# Patient Record
Sex: FEMALE | Race: WHITE | URBAN - METROPOLITAN AREA
[De-identification: names, ages, dates, MRNs, and addresses within clinical notes are randomized per-mention and may not be internally consistent; named-entity substitution may affect disease eponyms.]

---

## 2017-04-03 RX ORDER — NEBIVOLOL HYDROCHLORIDE 10 MG/1
TABLET ORAL
Qty: 180 TAB | Refills: 3 | Status: SHIPPED | OUTPATIENT
Start: 2017-04-03 | End: 2018-04-17 | Stop reason: SDUPTHER

## 2017-04-07 RX ORDER — INSULIN GLARGINE 100 [IU]/ML
INJECTION, SOLUTION SUBCUTANEOUS
Qty: 20 ML | Refills: 11 | Status: SHIPPED | OUTPATIENT
Start: 2017-04-07 | End: 2018-04-09 | Stop reason: SDUPTHER

## 2017-04-18 RX ORDER — METFORMIN HYDROCHLORIDE 1000 MG/1
TABLET ORAL
Qty: 180 TAB | Refills: 3 | Status: SHIPPED | OUTPATIENT
Start: 2017-04-18 | End: 2018-05-21 | Stop reason: SDUPTHER

## 2017-08-03 RX ORDER — BLOOD-GLUCOSE METER
KIT MISCELLANEOUS
Qty: 150 STRIP | Refills: 11 | Status: SHIPPED | OUTPATIENT
Start: 2017-08-03 | End: 2018-10-29 | Stop reason: SDUPTHER

## 2017-09-19 ENCOUNTER — OFFICE VISIT (OUTPATIENT)
Dept: ENDOCRINOLOGY | Age: 67
End: 2017-09-19

## 2017-09-19 VITALS
BODY MASS INDEX: 35.37 KG/M2 | WEIGHT: 192.2 LBS | HEIGHT: 62 IN | SYSTOLIC BLOOD PRESSURE: 155 MMHG | HEART RATE: 58 BPM | DIASTOLIC BLOOD PRESSURE: 80 MMHG

## 2017-09-19 DIAGNOSIS — E11.9 CONTROLLED TYPE 2 DIABETES MELLITUS WITHOUT COMPLICATION, WITH LONG-TERM CURRENT USE OF INSULIN (HCC): ICD-10-CM

## 2017-09-19 DIAGNOSIS — Z79.4 CONTROLLED TYPE 2 DIABETES MELLITUS WITHOUT COMPLICATION, WITH LONG-TERM CURRENT USE OF INSULIN (HCC): ICD-10-CM

## 2017-09-19 DIAGNOSIS — I10 ESSENTIAL HYPERTENSION, BENIGN: Primary | ICD-10-CM

## 2017-09-19 DIAGNOSIS — E78.2 MIXED HYPERLIPIDEMIA: ICD-10-CM

## 2017-09-19 RX ORDER — HYDROCODONE BITARTRATE AND ACETAMINOPHEN 7.5; 325 MG/1; MG/1
TABLET ORAL
Refills: 0 | COMMUNITY
Start: 2017-09-11 | End: 2021-02-16

## 2017-09-19 RX ORDER — HYDROCHLOROTHIAZIDE 12.5 MG/1
25 TABLET ORAL DAILY
COMMUNITY
End: 2022-03-01

## 2017-09-19 RX ORDER — LISINOPRIL 40 MG/1
40 TABLET ORAL DAILY
COMMUNITY
End: 2022-03-01

## 2017-09-19 RX ORDER — GABAPENTIN 800 MG/1
TABLET ORAL 3 TIMES DAILY
COMMUNITY

## 2017-09-19 NOTE — PATIENT INSTRUCTIONS
1) Try not to snack on anything with sugar after dinner as this will cause your blood sugar to be higher the next morning. You can try sugar free jello or pudding or raw veggies or nuts as these won't cause your sugar to spike overnight or take a dose of protein before bed (slice of cheese or deli meat or handful of almonds or 1 teaspoon of peanut butter). If you do have a craving for some carbs at bedtime, take 6-8 units of novolog to cover this to prevent your fasting sugar from being higher. 2) If you don't eat any carbs at night and are still having fasting sugars over 130, then think about increasing the lantus to 45 units. 3) I will send you a message through [employer] with your lab results. 4) It's possible you may need to go back on a full 25 mg of hctz to help with blood pressure so discuss with your cardiologist to see if they are in agreement. 5) I will mail you a lab slip about 3-4 weeks before your next visit to have your labs repeated 3-4 days prior to your next visit.

## 2017-09-19 NOTE — PROGRESS NOTES
Chief Complaint   Patient presents with    Diabetes     pcp and pharmacy confirmed    Other     eye exam is due    Other     pcp jason Smith     History of Present Illness: Miguel A Kessler is a 79 y.o. female here for follow up of diabetes. Weight up 2 lbs since last visit in 9/17. Had right knee arthroscopy in 1/17 to try to buy time for a knee replacement and did help for the first 6 months but recently is starting to have more pain. About 2 months developed an episode of vertigo and was given meclizine and this helped. Aside from these 2 things, no other new medical problems over the past year. Currently taking lantus 40 units at bedtime and about 10-20 units of novolog before food. Fasting sugars have been higher recently in the 140-170s but other days can be in the 90-120s. Is currently in a lot of pain with her knee and back and this can be contributing to some of her higher sugars. During the day can keep her sugars between . She does admit to having some carbs at bedtime like toast or cereal and doesn't take any novolog for this. States that her lis/hctz was stopped and changed to lisinopril 40 mg daily and hctz 12.5 mg daily by her cardiologist but her BP is running higher today. Current Outpatient Prescriptions   Medication Sig    HYDROcodone-acetaminophen (NORCO) 7.5-325 mg per tablet TAKE 1 TABLET BY MOUTH TWICE DAILY AS NEEDED    gabapentin (NEURONTIN) 800 mg tablet Take  by mouth three (3) times daily.  lisinopril (PRINIVIL, ZESTRIL) 40 mg tablet Take 40 mg by mouth daily.  hydroCHLOROthiazide (HYDRODIURIL) 12.5 mg tablet Take 12.5 mg by mouth daily.  FREESTYLE LITE STRIPS strip USE TO TEST UP TO 4 TIMES DAILY    metFORMIN (GLUCOPHAGE) 1,000 mg tablet TAKE 1 TABLET BY MOUTH TWO TIMES DAILY (WITH MEALS).     LANTUS 100 unit/mL injection INJECT 36 UNITS SUBCUTANEOUSLY NIGHTLY OR AS DIRECTED UP TO 60 UNITS PER DAY    BYSTOLIC 10 mg tablet TAKE 1 TABLET BY MOUTH TWO TIMES A DAY.  ZETIA 10 mg tablet TAKE 1 TABLET BY MOUTH DAILY    tapentadol (NUCYNTA) 50 mg tablet Take 50 mg by mouth three (3) times daily.  NOVOLOG 100 unit/mL injection INJECT 21-36 UNITS BEFORE MEALS    TRUEPLUS INSULIN 1 mL 31 gauge x 5/16 syrg USE AS DIRECTED TO INJECT INSULIN DAILY    isosorbide mononitrate ER (IMDUR) 30 mg tablet Take  by mouth daily.  ULTRA COMFORT INSULIN SYRINGE 1 mL 31 x 5/16\" syrg USE AS DIRECTED TO INJECT INSULIN DAILY    propranolol (INDERAL) 10 mg tablet Take  by mouth two (2) times a day.  OTHER,NON-FORMULARY, Plasamanex      1 po bid for blood thinning    Insulin Needles, Disposable, (BD INSULIN PEN NEEDLE UF SHORT) 31 X 5/16 \" Ndle Use as directed up to 4 times daily    CALCIUM CARBONATE/VITAMIN D3 (CALCIUM + D PO) Take  by mouth daily.  duloxetine (CYMBALTA) 60 mg capsule Take 60 mg by mouth daily.  trazodone (DESYREL) 100 mg tablet Take 100 mg by mouth nightly.  UBIDECARENONE/VITAMIN E MIXED (COQ10  PO) Take  by mouth daily.  aspirin delayed-release 81 mg tablet Take  by mouth daily.  ERGOCALCIFEROL, VITAMIN D2, (VITAMIN D PO) Take 2,000 Units by mouth. 4 drops daily      No current facility-administered medications for this visit.       Allergies   Allergen Reactions    Adhesive Tape-Silicones Rash    Compazine [Prochlorperazine Edisylate] Other (comments)    Gemfibrozil Other (comments)     Nausea, liver pain, bone pain    Inova [Benzoyl Peroxide] Other (comments)    Iodine Shortness of Breath, Rash and Swelling    Oxycontin [Oxycodone] Rash    Statins-Hmg-Coa Reductase Inhibitors Myalgia     With lipitor, crestor, and pravastatin    Wellbutrin [Bupropion Hcl] Other (comments)     Had homicidal thoughts     Review of Systems:  - Eyes: no blurry vision or double vision  - Cardiovascular: no chest pain  - Respiratory: no shortness of breath  - Musculoskeletal: no myalgias  - Neurological: occ numbness/tingling in extremities    Physical Examination:  Blood pressure 155/80, pulse (!) 58, height 5' 2\" (1.575 m), weight 192 lb 3.2 oz (87.2 kg). - General: pleasant, no distress, good eye contact   - Neck: no carotid bruits  - Cardiovascular: regular, normal rate, nl s1 and s2, no m/r/g,   - Respiratory: clear bilaterally  - Integumentary: no edema,   - Psychiatric: normal mood and affect         Diabetic foot exam performed by Lennie Jarvis MD     Measurement  Response Nurse Comment Physician Comment   Monofilament  R - reduced sensation with micro filament  L - reduced sensation with micro filament     Pulse DP R - 2+ (normal)  L - 2+ (normal)     Vibration R - normal  L - normal     Structural deformity R - None  L - None     Skin Integrity / Deformity R - Mild - callus  L - Mild - callus        Reviewed by:                 Data Reviewed:   - none new for review    Assessment/Plan:   1. DM w/o complication type II, controlled (250.00) her most recent Hgb A1c was 5.8% in 9/16 down from 6.7% in 3/16 up from 6.3% in 9/15 down from 6.7% in 3/15 down from 6.8% in 9/14 up from 5.9% in 4/14 down 6.3% in 10/13 stable from June down from 6.4% in Feb 2013 up from 6.3% in October up from 6.1% in June down from 6.5% in March down from 6.7% in December 2011 down from 7% in September down from 7.2% in June down from 9.5% in March. Her blood sugars are doing well aside from higher fasting sugars due to snacking on carbs at night so will work on this. I would prefer her A1c to be 6-7% rather than under 6% to help with lows. Will continue to see annually. - cont Lantus 40 units at bedtime  - cont Novolog 10-20 units with meals plus 3 units for every 50 mg/dl above 150 mg/dl  - cont Metformin 1000 mg bid  - check bs 3-4x day due to fluctuating blood sugars  - foot exam done 9/17  - optho UTD spring 2014--due now  - microalbumin nl 9/16  - check Hgb A1c, cmp, and microalbumin today and prior to next visit       2.  Unspecified essential hypertension (401.9) her BP was above goal < 140/90 but normally is at goal but had her hctz decreased by cardiology and may need to have this increased. -  cont current regimen      3. Mixed hyperlipidemia: Given DM and CAD, Goal LDL < 70, non-HDL < 100, and TG < 150. Non- and LDL 78 on zetia in Oct 2012, up to 137 and 92 in 6/13 due to weight gain and 145 and 90 in 10/13. Down to 140 and 88 in 4/14 and 131 and 74 in 9/14 and 135 and 100 in 3/15 and 85 in 9/15 and 64 in 3/16 and 84 in 9/16. May consider PCSK9 inhibitor in the future. - cont zetia 10 mg daily  - check lipids today and prior to next visit       Patient Instructions   1) Try not to snack on anything with sugar after dinner as this will cause your blood sugar to be higher the next morning. You can try sugar free jello or pudding or raw veggies or nuts as these won't cause your sugar to spike overnight or take a dose of protein before bed (slice of cheese or deli meat or handful of almonds or 1 teaspoon of peanut butter). If you do have a craving for some carbs at bedtime, take 6-8 units of novolog to cover this to prevent your fasting sugar from being higher. 2) If you don't eat any carbs at night and are still having fasting sugars over 130, then think about increasing the lantus to 45 units. 3) I will send you a message through [employer] with your lab results. 4) It's possible you may need to go back on a full 25 mg of hctz to help with blood pressure so discuss with your cardiologist to see if they are in agreement. 5) I will mail you a lab slip about 3-4 weeks before your next visit to have your labs repeated 3-4 days prior to your next visit. Follow-up Disposition:  Return in about 1 year (around 9/19/2018).     Copy sent to:  Dr. Sundar Cotter Fax: [phone], Phone [phone]  Dr. Cayla Duffy (cardiology at [facility] in [place])    Lab follow up: 9/23/17  Component      Latest Ref Rng & Units 9/19/2017 9/19/2017 9/19/2017 9/19/2017           5:01 PM  5:01 PM  5:01 PM  5:01 PM   Glucose      65 - 99 mg/dL  117 (H)     BUN      8 - 27 mg/dL  18     Creatinine      0.57 - 1.00 mg/dL  0.97     GFR est non-AA      >59 mL/min/1.73  61     GFR est AA      >59 mL/min/1.73  70     BUN/Creatinine ratio      12 - 28  19     Sodium      134 - 144 mmol/L  137     Potassium      3.5 - 5.2 mmol/L  4.5     Chloride      96 - 106 mmol/L  99     CO2      18 - 29 mmol/L  24     Calcium      8.7 - 10.3 mg/dL  9.3     Protein, total      6.0 - 8.5 g/dL  6.6     Albumin      3.6 - 4.8 g/dL  4.4     GLOBULIN, TOTAL      1.5 - 4.5 g/dL  2.2     A-G Ratio      1.2 - 2.2  2.0     Bilirubin, total      0.0 - 1.2 mg/dL  0.3     Alk. phosphatase      39 - 117 IU/L  75     AST      0 - 40 IU/L  12     ALT (SGPT)      0 - 32 IU/L  18     Cholesterol, total      100 - 199 mg/dL 212 (H)      Triglyceride      0 - 149 mg/dL 240 (H)      HDL Cholesterol      >39 mg/dL 49      VLDL, calculated      5 - 40 mg/dL 48 (H)      LDL, calculated      0 - 99 mg/dL 115 (H)      Creatinine, urine      Not Estab. mg/dL   132.0    Microalbumin, urine      Not Estab. ug/mL   6.1    Microalbumin/Creat. Ratio      0.0 - 30.0 mg/g creat   4.6    Hemoglobin A1c, (calculated)      4.8 - 5.6 %    6.9 (H)   Estimated average glucose      mg/dL    151     Sent her the following message through [employer]:    Hemoglobin A1c is a 3 month marker of your diabetes control. Goal is less than 7% which means your average blood sugar is less than 150. Your Hemoglobin A1c is 6.9% which means your diabetes is under slightly worse control than 5.8% at your last check. Continue to work on your diet and exercise and take all your medications as directed.  -------------------------------------------------------------------------------------------------------------------  Total Cholesterol is the total number of cholesterol particles in your blood. Goal is less than 200. Your value is above goal.    Triglycerides are the short term fats in your blood. Goal is less than 150. Your value is above goal.    HDL is the good cholesterol in your blood. Goal is more than 50. Your value is below goal.    LDL is the bad cholesterol in your blood. Goal is less than 70. Your value is above goal.    Continue to follow a low cholesterol diet. Try to limit the amount of fried foods, fatty foods, butter, gravy, red meat, ice cream, cheese, and eggs in your diet, which are all high in cholesterol. Take all of your medications (zetia) as directed.  -------------------------------------------------------------------------------------------------------------------  BUN and creatinine are markers of kidney function. Your values are normal.  -------------------------------------------------------------------------------------------------------------------  ALT and AST are markers of liver function. Your values are normal.  -------------------------------------------------------------------------------------------------------------------  Microalbumin/creatinine ratio is a marker of the amount of protein in your urine. Goal is less than 30. Your value is normal. This indicates that your kidneys are not being affected by your diabetes and/or blood pressure. Continue to take lisinopril to help protect your kidneys from the effects of diabetes and high blood pressure.

## 2017-09-19 NOTE — PROGRESS NOTES
HISTORY OF PRESENT ILLNESS  Rosa Rowe is a 79 y.o. female.   HPI    ROS    Physical Exam    ASSESSMENT and PLAN  {ASSESSMENT/PLAN:12087}

## 2017-09-19 NOTE — MR AVS SNAPSHOT
Visit Information Date & Time Provider Department Dept. Phone Encounter #  
 9/19/2017  1:30 PM Gisele Ramirez, [address] [facility] [phone] [id number] Follow-up Instructions Return in about 1 year (around 9/19/2018). Upcoming Health Maintenance Date Due Hepatitis C Screening 1950 DTaP/Tdap/Td series (1 - Tdap) 6/29/1971 BREAST CANCER SCRN MAMMOGRAM 6/29/2000 FOBT Q 1 YEAR AGE 50-75 6/29/2000 ZOSTER VACCINE AGE 60> 4/29/2010 EYE EXAM RETINAL OR DILATED Q1 3/1/2015 GLAUCOMA SCREENING Q2Y 6/29/2015 OSTEOPOROSIS SCREENING (DEXA) 6/29/2015 Pneumococcal 65+ Low/Medium Risk (1 of 2 - PCV13) 6/29/2015 MEDICARE YEARLY EXAM 6/29/2015 HEMOGLOBIN A1C Q6M 3/13/2017 MICROALBUMIN Q1 3/22/2017 INFLUENZA AGE 9 TO ADULT 8/1/2017 LIPID PANEL Q1 9/13/2017 FOOT EXAM Q1 9/23/2017 Allergies as of 9/19/2017  Review Complete On: 9/19/2017 By: Gisele Ramirez MD  
  
 Severity Noted Reaction Type Reactions Adhesive Tape-silicones  53/83/8695   Intolerance Rash Compazine [Prochlorperazine Edisylate]  03/07/2011   Intolerance Other (comments) Gemfibrozil  07/21/2011    Other (comments) Nausea, liver pain, bone pain Gustavus Crank [Benzoyl Peroxide]  03/07/2011   Intolerance Other (comments) Iodine  03/07/2011   Intolerance Shortness of Breath, Rash, Swelling Oxycontin [Oxycodone]  03/07/2011    Rash Statins-hmg-coa Reductase Inhibitors  03/07/2011    Myalgia With lipitor, crestor, and pravastatin Wellbutrin [Bupropion Hcl]  03/07/2011    Other (comments) Had homicidal thoughts Current Immunizations  Never Reviewed No immunizations on file. Not reviewed this visit You Were Diagnosed With   
  
 Codes Comments Essential hypertension, benign    -  Primary ICD-10-CM: I10 
ICD-9-CM: 401.1 Mixed hyperlipidemia     ICD-10-CM: E78.2 ICD-9-CM: 272.2 Controlled type 2 diabetes mellitus without complication, with long-term current use of insulin (HCC)     ICD-10-CM: E11.9, Z79.4 ICD-9-CM: 250.00, V58.67 Vitals BP Pulse Height(growth percentile) Weight(growth percentile) BMI Smoking Status 155/80 (!) 58 5' 2\" (1.575 m) 192 lb 3.2 oz (87.2 kg) 35.15 kg/m2 Former Smoker Vitals History BMI and BSA Data Body Mass Index Body Surface Area  
 35.15 kg/m 2 1.95 m 2 Preferred Pharmacy Pharmacy Name Phone [phone] Your Updated Medication List  
  
   
This list is accurate as of: 9/19/17  2:08 PM.  Always use your most recent med list.  
  
  
  
  
 aspirin delayed-release 81 mg tablet Take  by mouth daily. BYSTOLIC 10 mg tablet Generic drug:  nebivolol TAKE 1 TABLET BY MOUTH TWO TIMES A DAY. CALCIUM + D PO Take  by mouth daily. COQ10  PO Take  by mouth daily. CYMBALTA 60 mg capsule Generic drug:  DULoxetine Take 60 mg by mouth daily. FREESTYLE LITE STRIPS strip Generic drug:  glucose blood VI test strips USE TO TEST UP TO 4 TIMES DAILY  
  
 gabapentin 800 mg tablet Commonly known as:  NEURONTIN Take  by mouth three (3) times daily. hydroCHLOROthiazide 12.5 mg tablet Commonly known as:  HYDRODIURIL Take 12.5 mg by mouth daily. HYDROcodone-acetaminophen 7.5-325 mg per tablet Commonly known as:  NORCO  
TAKE 1 TABLET BY MOUTH TWICE DAILY AS NEEDED IMDUR 30 mg tablet Generic drug:  isosorbide mononitrate ER Take  by mouth daily. Insulin Needles (Disposable) 31 gauge x 5/16\" Ndle Commonly known as:  BD INSULIN PEN NEEDLE UF SHORT Use as directed up to 4 times daily LANTUS 100 unit/mL injection Generic drug:  insulin glargine INJECT 36 UNITS SUBCUTANEOUSLY NIGHTLY OR AS DIRECTED UP TO 60 UNITS PER DAY  
  
 lisinopril 40 mg tablet Commonly known as:  [place] Benitez Take 40 mg by mouth daily. metFORMIN 1,000 mg tablet Commonly known as:  GLUCOPHAGE  
TAKE 1 TABLET BY MOUTH TWO TIMES DAILY (WITH MEALS). NovoLOG 100 unit/mL injection Generic drug:  insulin aspart INJECT 21-36 UNITS BEFORE MEALS  
  
 NUCYNTA 50 mg tablet Generic drug:  tapentadol Take 50 mg by mouth three (3) times daily. OTHER(NON-FORMULARY) Plasamanex      1 po bid for blood thinning  
  
 propranolol 10 mg tablet Commonly known as:  INDERAL Take  by mouth two (2) times a day. traZODone 100 mg tablet Commonly known as:  [place] Take 100 mg by mouth nightly. * ULTRA COMFORT INSULIN SYRINGE 1 mL 31 gauge x 5/16 Syrg Generic drug:  Insulin Syringe-Needle U-100  
USE AS DIRECTED TO INJECT INSULIN DAILY * TRUEPLUS INSULIN 1 mL 31 gauge x 5/16 Syrg Generic drug:  Insulin Syringe-Needle U-100  
USE AS DIRECTED TO INJECT INSULIN DAILY  
  
 VITAMIN D2 PO Take 2,000 Units by mouth. 4 drops daily ZETIA 10 mg tablet Generic drug:  ezetimibe TAKE 1 TABLET BY MOUTH DAILY * Notice: This list has 2 medication(s) that are the same as other medications prescribed for you. Read the directions carefully, and ask your doctor or other care provider to review them with you. We Performed the Following HEMOGLOBIN A1C WITH EAG [79174 CPT(R)] LIPID PANEL [03037 CPT(R)] METABOLIC PANEL, COMPREHENSIVE [14886 CPT(R)] MICROALBUMIN, UR, RAND W/ MICROALBUMIN/CREA RATIO [id number] CPT(R)] GA COLLECTION VENOUS BLOOD,VENIPUNCTURE [id number] CPT(R)] GA HANDLG&/OR CONVEY OF SPEC FOR TR OFFICE TO LAB [65388 CPT(R)] Follow-up Instructions Return in about 1 year (around 9/19/2018). Patient Instructions 1) Try not to snack on anything with sugar after dinner as this will cause your blood sugar to be higher the next morning.   You can try sugar free jello or pudding or raw veggies or nuts as these won't cause your sugar to spike overnight or take a dose of protein before bed (slice of cheese or deli meat or handful of almonds or 1 teaspoon of peanut butter). If you do have a craving for some carbs at bedtime, take 6-8 units of novolog to cover this to prevent your fasting sugar from being higher. 2) If you don't eat any carbs at night and are still having fasting sugars over 130, then think about increasing the lantus to 45 units. 3) I will send you a message through [employer] with your lab results. 4) It's possible you may need to go back on a full 25 mg of hctz to help with blood pressure so discuss with your cardiologist to see if they are in agreement. 5) I will mail you a lab slip about 3-4 weeks before your next visit to have your labs repeated 3-4 days prior to your next visit. Introducing [facility]! Dear Mona Martins: Thank you for requesting a [employer] account. Our records indicate that you already have an active [employer] account. You can access your account anytime at [email or web address] Did you know that you can access your hospital and ER discharge instructions at any time in [employer]? You can also review all of your test results from your hospital stay or ER visit. Additional Information If you have questions, please visit the Frequently Asked Questions section of the [employer] website at [email or web address] Remember, [employer] is NOT to be used for urgent needs. For medical emergencies, dial 911. Now available from your iPhone and Android! Please provide this summary of care documentation to your next provider. If you have any questions after today's visit, please call [phone].

## 2017-09-21 LAB
ALBUMIN SERPL-MCNC: 4.4 G/DL (ref 3.6–4.8)
ALBUMIN/CREAT UR: 4.6 MG/G CREAT (ref 0–30)
ALBUMIN/GLOB SERPL: 2 {RATIO} (ref 1.2–2.2)
ALP SERPL-CCNC: 75 IU/L (ref 39–117)
ALT SERPL-CCNC: 18 IU/L (ref 0–32)
AST SERPL-CCNC: 12 IU/L (ref 0–40)
BILIRUB SERPL-MCNC: 0.3 MG/DL (ref 0–1.2)
BUN SERPL-MCNC: 18 MG/DL (ref 8–27)
BUN/CREAT SERPL: 19 (ref 12–28)
CALCIUM SERPL-MCNC: 9.3 MG/DL (ref 8.7–10.3)
CHLORIDE SERPL-SCNC: 99 MMOL/L (ref 96–106)
CHOLEST SERPL-MCNC: 212 MG/DL (ref 100–199)
CO2 SERPL-SCNC: 24 MMOL/L (ref 18–29)
CREAT SERPL-MCNC: 0.97 MG/DL (ref 0.57–1)
CREAT UR-MCNC: 132 MG/DL
EST. AVERAGE GLUCOSE BLD GHB EST-MCNC: 151 MG/DL
GLOBULIN SER CALC-MCNC: 2.2 G/DL (ref 1.5–4.5)
GLUCOSE SERPL-MCNC: 117 MG/DL (ref 65–99)
HBA1C MFR BLD: 6.9 % (ref 4.8–5.6)
HDLC SERPL-MCNC: 49 MG/DL
INTERPRETATION, 910389: NORMAL
LDLC SERPL CALC-MCNC: 115 MG/DL (ref 0–99)
Lab: NORMAL
MICROALBUMIN UR-MCNC: 6.1 UG/ML
POTASSIUM SERPL-SCNC: 4.5 MMOL/L (ref 3.5–5.2)
PROT SERPL-MCNC: 6.6 G/DL (ref 6–8.5)
SODIUM SERPL-SCNC: 137 MMOL/L (ref 134–144)
TRIGL SERPL-MCNC: 240 MG/DL (ref 0–149)
VLDLC SERPL CALC-MCNC: 48 MG/DL (ref 5–40)

## 2017-10-05 RX ORDER — INSULIN ASPART 100 [IU]/ML
INJECTION, SOLUTION INTRAVENOUS; SUBCUTANEOUS
Qty: 30 ML | Refills: 11 | Status: SHIPPED | OUTPATIENT
Start: 2017-10-05 | End: 2018-12-15 | Stop reason: SDUPTHER

## 2017-11-29 RX ORDER — EZETIMIBE 10 MG
TABLET ORAL
Qty: 30 TAB | Refills: 11 | Status: SHIPPED | OUTPATIENT
Start: 2017-11-29 | End: 2018-11-15 | Stop reason: SDUPTHER

## 2017-12-05 RX ORDER — SYRING-NEEDL,DISP,INSUL,0.3 ML 30 GX5/16"
SYRINGE, EMPTY DISPOSABLE MISCELLANEOUS
Qty: 100 SYRINGE | Refills: 11 | Status: SHIPPED | OUTPATIENT
Start: 2017-12-05 | End: 2019-01-22 | Stop reason: SDUPTHER

## 2018-04-10 RX ORDER — INSULIN GLARGINE 100 [IU]/ML
INJECTION, SOLUTION SUBCUTANEOUS
Qty: 20 ML | Refills: 11 | Status: SHIPPED | OUTPATIENT
Start: 2018-04-10 | End: 2019-04-15 | Stop reason: SDUPTHER

## 2018-04-17 RX ORDER — NEBIVOLOL HYDROCHLORIDE 10 MG/1
TABLET ORAL
Qty: 180 TAB | Refills: 3 | Status: SHIPPED | OUTPATIENT
Start: 2018-04-17 | End: 2019-05-11 | Stop reason: SDUPTHER

## 2018-05-21 RX ORDER — METFORMIN HYDROCHLORIDE 1000 MG/1
TABLET ORAL
Qty: 180 TAB | Refills: 3 | Status: SHIPPED | OUTPATIENT
Start: 2018-05-21 | End: 2019-06-11 | Stop reason: SDUPTHER

## 2018-08-22 DIAGNOSIS — Z79.4 CONTROLLED TYPE 2 DIABETES MELLITUS WITHOUT COMPLICATION, WITH LONG-TERM CURRENT USE OF INSULIN (HCC): Primary | ICD-10-CM

## 2018-08-22 DIAGNOSIS — I10 ESSENTIAL HYPERTENSION, BENIGN: ICD-10-CM

## 2018-08-22 DIAGNOSIS — E78.2 MIXED HYPERLIPIDEMIA: ICD-10-CM

## 2018-08-22 DIAGNOSIS — E11.9 CONTROLLED TYPE 2 DIABETES MELLITUS WITHOUT COMPLICATION, WITH LONG-TERM CURRENT USE OF INSULIN (HCC): Primary | ICD-10-CM

## 2018-10-29 DIAGNOSIS — Z79.4 CONTROLLED TYPE 2 DIABETES MELLITUS WITHOUT COMPLICATION, WITH LONG-TERM CURRENT USE OF INSULIN (HCC): Primary | ICD-10-CM

## 2018-10-29 DIAGNOSIS — E11.9 CONTROLLED TYPE 2 DIABETES MELLITUS WITHOUT COMPLICATION, WITH LONG-TERM CURRENT USE OF INSULIN (HCC): Primary | ICD-10-CM

## 2018-10-29 RX ORDER — BLOOD-GLUCOSE METER
KIT MISCELLANEOUS
Qty: 150 STRIP | Refills: 11 | Status: SHIPPED | OUTPATIENT
Start: 2018-10-29 | End: 2020-02-20

## 2018-11-15 RX ORDER — EZETIMIBE 10 MG
TABLET ORAL
Qty: 30 TAB | Refills: 11 | Status: SHIPPED | OUTPATIENT
Start: 2018-11-15 | End: 2019-11-18 | Stop reason: SDUPTHER

## 2018-12-15 RX ORDER — INSULIN ASPART 100 [IU]/ML
INJECTION, SOLUTION INTRAVENOUS; SUBCUTANEOUS
Qty: 30 ML | Refills: 11 | Status: SHIPPED | OUTPATIENT
Start: 2018-12-15 | End: 2020-01-15

## 2019-01-22 RX ORDER — CALCIUM CARB/VITAMIN D3/VIT K1 500-100-40
TABLET,CHEWABLE ORAL
Qty: 100 SYRINGE | Refills: 11 | Status: SHIPPED | OUTPATIENT
Start: 2019-01-22 | End: 2020-02-17

## 2019-04-15 RX ORDER — INSULIN GLARGINE 100 [IU]/ML
INJECTION, SOLUTION SUBCUTANEOUS
Qty: 20 ML | Refills: 11 | Status: SHIPPED | OUTPATIENT
Start: 2019-04-15 | End: 2019-05-20

## 2019-05-11 RX ORDER — NEBIVOLOL HYDROCHLORIDE 10 MG/1
TABLET ORAL
Qty: 180 TAB | Refills: 3 | Status: SHIPPED | OUTPATIENT
Start: 2019-05-11 | End: 2020-05-19

## 2019-05-14 LAB
CREATININE, EXTERNAL: 1.05
HBA1C MFR BLD HPLC: 7.4 %
LDL-C, EXTERNAL: 128
MICROALBUMIN UR TEST STR-MCNC: 0.6 MG/DL

## 2019-05-20 ENCOUNTER — OFFICE VISIT (OUTPATIENT)
Dept: ENDOCRINOLOGY | Age: 69
End: 2019-05-20

## 2019-05-20 VITALS
SYSTOLIC BLOOD PRESSURE: 145 MMHG | DIASTOLIC BLOOD PRESSURE: 76 MMHG | HEIGHT: 62 IN | WEIGHT: 190.2 LBS | BODY MASS INDEX: 35 KG/M2 | HEART RATE: 69 BPM

## 2019-05-20 DIAGNOSIS — I10 ESSENTIAL HYPERTENSION, BENIGN: ICD-10-CM

## 2019-05-20 DIAGNOSIS — Z79.4 CONTROLLED TYPE 2 DIABETES MELLITUS WITHOUT COMPLICATION, WITH LONG-TERM CURRENT USE OF INSULIN (HCC): Primary | ICD-10-CM

## 2019-05-20 DIAGNOSIS — E78.2 MIXED HYPERLIPIDEMIA: ICD-10-CM

## 2019-05-20 DIAGNOSIS — E11.9 CONTROLLED TYPE 2 DIABETES MELLITUS WITHOUT COMPLICATION, WITH LONG-TERM CURRENT USE OF INSULIN (HCC): Primary | ICD-10-CM

## 2019-05-20 RX ORDER — INSULIN GLARGINE 100 [IU]/ML
INJECTION, SOLUTION SUBCUTANEOUS
Qty: 20 ML | Refills: 11
Start: 2019-05-20 | End: 2020-02-18

## 2019-05-20 NOTE — PROGRESS NOTES
Chief Complaint Patient presents with  Diabetes  
  pcp amd pharmacy confirmed  Other  
  eye exam is due History of Present Illness: Yaquelin Kenny is a 76 y.o. female here for follow up of diabetes. Weight down 2 lbs since last visit in 9/17. Underwent right knee replacement in 1/18 and had back surgery in 4/19. She is  from her  the past 3 years but has remained . Her A1c was 7.1% about 6 months ago and the pain has been driving up her blood sugar and currently is taking 50 units of lantus per day for the past 6 months. Has been taking 10-20 units of novolog with meals. Over the past month her pain has been much improved and her fasting sugars have come way down to the 110s or lower. Has been trying to eat more protein to help with healing. In the afternoon is usually under 175. Has been eating more prepared meals recently that could have some higher fat content and still taking zetia and hasn't been out of this. Has been walking to her mailbox and back which is about a city block for the past few weeks. Her hctz has been increased back to 25 mg daily. Her home BP readings are under 140/90. Current Outpatient Medications Medication Sig  
 insulin glargine (LANTUS U-100 INSULIN) 100 unit/mL injection INJECT 50 UNITS SUBCUTANEOUSLY NIGHTLY OR AS DIRECTED UP TO 60 UNITS PER DAY--Dose change 5/20/19--updated med list--did not send prescription to the pharmacy  BYSTOLIC 10 mg tablet TAKE 1 TABLET BY MOUTH TWO TIMES A DAY  Insulin Syringe-Needle U-100 1 mL 31 gauge x 5/16 syrg USE AS DIRECTED TO INJECT INSULIN DAILY  NOVOLOG U-100 INSULIN ASPART 100 unit/mL injection INJECT 21-36 UNITS SUBCUTANEOUSLY BEFORE MEALS  ZETIA 10 mg tablet TAKE 1 TABLET BY MOUTH DAILY  FREESTYLE LITE STRIPS strip USE TO TEST BLOOD SUGAR UP TO 4 TIMES DAILY  metFORMIN (GLUCOPHAGE) 1,000 mg tablet TAKE ONE TABLET BY MOUTH TWICE DAILY WITH MEALS  
  HYDROcodone-acetaminophen (NORCO) 7.5-325 mg per tablet TAKE 1 TABLET BY MOUTH TWICE DAILY AS NEEDED  
 gabapentin (NEURONTIN) 800 mg tablet Take  by mouth three (3) times daily.  lisinopril (PRINIVIL, ZESTRIL) 40 mg tablet Take 40 mg by mouth daily.  hydroCHLOROthiazide (HYDRODIURIL) 12.5 mg tablet Take 25 mg by mouth daily.  isosorbide mononitrate ER (IMDUR) 30 mg tablet Take  by mouth daily.  ULTRA COMFORT INSULIN SYRINGE 1 mL 31 x 5/16\" syrg USE AS DIRECTED TO INJECT INSULIN DAILY  propranolol (INDERAL) 10 mg tablet Take  by mouth two (2) times a day.  OTHER,NON-FORMULARY, Plasamanex      1 po bid for blood thinning  Insulin Needles, Disposable, (BD INSULIN PEN NEEDLE UF SHORT) 31 X 5/16 \" Ndle Use as directed up to 4 times daily  CALCIUM CARBONATE/VITAMIN D3 (CALCIUM + D PO) Take  by mouth daily.  duloxetine (CYMBALTA) 60 mg capsule Take 60 mg by mouth daily.  trazodone (DESYREL) 100 mg tablet Take 100 mg by mouth nightly.  UBIDECARENONE/VITAMIN E MIXED (COQ10  PO) Take  by mouth daily.  aspirin delayed-release 81 mg tablet Take  by mouth daily.  ERGOCALCIFEROL, VITAMIN D2, (VITAMIN D PO) Take 4,000 Units by mouth. No current facility-administered medications for this visit. Allergies Allergen Reactions  Adhesive Tape-Silicones Rash  Compazine [Prochlorperazine Edisylate] Other (comments)  Gemfibrozil Other (comments) Nausea, liver pain, bone pain  [place] Petrin Peroxide] Other (comments)  Iodine Shortness of Breath, Rash and Swelling  Oxycontin [Oxycodone] Rash  Statins-Hmg-Coa Reductase Inhibitors Myalgia With lipitor, crestor, and pravastatin  Wellbutrin [Bupropion Hcl] Other (comments) Had homicidal thoughts Review of Systems: - Eyes: no blurry vision or double vision - Cardiovascular: no chest pain - Respiratory: no shortness of breath - Musculoskeletal: no myalgias - Neurological: no numbness/tingling in extremities Physical Examination: 
Blood pressure 145/76, pulse 69, height 5' 2\" (1.575 m), weight 190 lb 3.2 oz (86.3 kg). - General: pleasant, no distress, good eye contact  
- Neck: no carotid bruits - Cardiovascular: regular, normal rate, nl s1 and s2, no m/r/g,  
- Respiratory: clear bilaterally - Integumentary: no edema,  
- Psychiatric: normal mood and affect Diabetic foot exam:  
 
Left Foot: 
 Visual Exam: callous - mild Pulse DP: 2+ (normal) Filament test: normal sensation Vibratory sensation: normal 
   
Right Foot: 
 Visual Exam: callous - mild Pulse DP: 2+ (normal) Filament test: normal sensation Vibratory sensation: normal 
 
 
Data Reviewed:  
- Hgb A1c 7.4% 
- lipids: total 221 ,  HDL 50, ,  - ALT 15, AST 13 
- BUN/Cr 25/1.05 
- microalbumin 4 Assessment/Plan: 1. DM w/o complication type II, controlled (250.00) her most recent Hgb A1c was 7.4% in 5/19 up from 6.9% in 9/17 up from 5.8% in 9/16 down from 6.7% in 3/16 up from 6.3% in 9/15 down from 6.7% in 3/15 down from 6.8% in 9/14 up from 5.9% in 4/14 down 6.3% in 10/13 stable from June down from 6.4% in Feb 2013 up from 6.3% in October up from 6.1% in June down from 6.5% in March down from 6.7% in December 2011 down from 7% in September down from 7.2% in June down from 9.5% in March. Her sugars have been higher due to more pain but are coming down now that she has had surgery. - cont Lantus 50 units at bedtime and taper as below 
- cont Novolog 10-20 units with meals plus 3 units for every 50 mg/dl above 150 mg/dl 
- cont Metformin 1000 mg bid 
- check bs 3 times per day 
- foot exam done 5/19 
- optho UTD spring 2014--due now 
- microalbumin nl 5/18 
- check Hgb A1c and cmp prior to next visit (send to [address]) 2. Unspecified essential hypertension (401.9) her BP was above goal < 140/90 but normally is at goal so won't make any changes -  cont current regimen 3. Mixed hyperlipidemia: Given DM and CAD, Goal LDL < 70, non-HDL < 100, and TG < 150. Non- and LDL 78 on zetia in Oct 2012, up to 137 and 92 in 6/13 due to weight gain and 145 and 90 in 10/13. Down to 140 and 88 in 4/14 and 131 and 74 in 9/14 and 135 and 100 in 3/15 and 85 in 9/15 and 64 in 3/16 and 84 in 9/16. Up to 115 in 9/17 and 128 in 5/19. May consider PCSK9 inhibitor in the future. - cont zetia 10 mg daily - check lipids prior to next visit Patient Instructions 1) Your sugars sound like they are coming down now that you have had surgery. If your fasting sugars are staying under 110 over the next week, then try cutting back by 4 units of lantus to keep your morning sugars between 110-125. 
 
2) Your blood pressure was high today but it sounds like home readings are better so I won't make any changes. 3) Your LDL (bad cholesterol) was 128 and we want to get this back under 100 so hopefully with better A1c and less fatty meals, this will improve. 4) Your urine protein was normal.   
 
 
Follow-up and Dispositions · Return in about 6 months (around 11/20/2019). Copy sent to: 
Dr. Scott Cruz Fax: [phone], Phone [phone] Dr. Jaylene Greene (cardiology at [facility] in [place])

## 2019-06-11 RX ORDER — METFORMIN HYDROCHLORIDE 1000 MG/1
TABLET ORAL
Qty: 180 TAB | Refills: 3 | Status: SHIPPED | OUTPATIENT
Start: 2019-06-11 | End: 2020-06-23

## 2019-11-18 RX ORDER — EZETIMIBE 10 MG
TABLET ORAL
Qty: 30 TAB | Refills: 11 | Status: SHIPPED | OUTPATIENT
Start: 2019-11-18 | End: 2020-12-08

## 2020-01-15 RX ORDER — INSULIN ASPART 100 [IU]/ML
INJECTION, SOLUTION INTRAVENOUS; SUBCUTANEOUS
Qty: 30 ML | Refills: 11 | Status: SHIPPED | OUTPATIENT
Start: 2020-01-15 | End: 2020-02-18

## 2020-02-15 LAB
ALB/GLOBRATIO, 58C: 2 (CALC) (ref 1–2.5)
ALBUMIN SERPL-MCNC: 4.2 G/DL (ref 3.6–5.1)
ALP SERPL-CCNC: 111 U/L (ref 37–153)
ALT SERPL-CCNC: 31 U/L (ref 6–29)
AST SERPL W P-5'-P-CCNC: 45 U/L (ref 10–35)
BILIRUB SERPL-MCNC: 0.4 MG/DL (ref 0.2–1.2)
BUN SERPL-MCNC: 26 MG/DL (ref 7–25)
BUN/CREATININE RATIO,BUCR: 23 (CALC) (ref 6–22)
CALCIUM SERPL-MCNC: 9.6 MG/DL (ref 8.6–10.4)
CHLORIDE SERPL-SCNC: 102 MMOL/L (ref 98–110)
CHOL/HDL RATIO,CHHDX: 4.1 (CALC)
CHOLEST SERPL-MCNC: 171 MG/DL
CO2 SERPL-SCNC: 31 MMOL/L (ref 20–32)
CREAT SERPL-MCNC: 1.12 MG/DL (ref 0.5–0.99)
EAG (MG/DL),9916804: 160 (CALC)
EAG (MMOL/L),9916805: 8.9 (CALC)
GLOBULIN,GLOB: 2.1 G/DL (CALC) (ref 1.9–3.7)
GLUCOSE SERPL-MCNC: 107 MG/DL (ref 65–99)
HBA1C MFR BLD HPLC: 7.2 % OF TOTAL HGB
HDLC SERPL-MCNC: 42 MG/DL
LDL-CHOLESTEROL: 98 MG/DL (CALC)
NON-HDL CHOLESTEROL, 011976: 129 MG/DL (CALC)
POTASSIUM SERPL-SCNC: 4.7 MMOL/L (ref 3.5–5.3)
PROT SERPL-MCNC: 6.3 G/DL (ref 6.1–8.1)
SODIUM SERPL-SCNC: 138 MMOL/L (ref 135–146)
TRIGL SERPL-MCNC: 213 MG/DL (ref ?–150)

## 2020-02-17 RX ORDER — CALCIUM CARB/VITAMIN D3/VIT K1 500-100-40
TABLET,CHEWABLE ORAL
Qty: 400 SYRINGE | Refills: 3 | Status: SHIPPED | OUTPATIENT
Start: 2020-02-17 | End: 2021-04-27

## 2020-02-18 ENCOUNTER — OFFICE VISIT (OUTPATIENT)
Dept: ENDOCRINOLOGY | Age: 70
End: 2020-02-18

## 2020-02-18 VITALS
WEIGHT: 183.3 LBS | DIASTOLIC BLOOD PRESSURE: 56 MMHG | HEART RATE: 74 BPM | HEIGHT: 62 IN | BODY MASS INDEX: 33.73 KG/M2 | SYSTOLIC BLOOD PRESSURE: 128 MMHG

## 2020-02-18 DIAGNOSIS — E11.21 TYPE 2 DIABETES WITH NEPHROPATHY (HCC): ICD-10-CM

## 2020-02-18 DIAGNOSIS — Z79.4 TYPE 2 DIABETES MELLITUS WITH DIABETIC NEUROPATHY, WITH LONG-TERM CURRENT USE OF INSULIN (HCC): ICD-10-CM

## 2020-02-18 DIAGNOSIS — E78.2 MIXED HYPERLIPIDEMIA: ICD-10-CM

## 2020-02-18 DIAGNOSIS — E11.40 TYPE 2 DIABETES MELLITUS WITH DIABETIC NEUROPATHY, WITH LONG-TERM CURRENT USE OF INSULIN (HCC): ICD-10-CM

## 2020-02-18 DIAGNOSIS — I10 ESSENTIAL HYPERTENSION, BENIGN: ICD-10-CM

## 2020-02-18 DIAGNOSIS — Z79.4 CONTROLLED TYPE 2 DIABETES MELLITUS WITHOUT COMPLICATION, WITH LONG-TERM CURRENT USE OF INSULIN (HCC): Primary | ICD-10-CM

## 2020-02-18 DIAGNOSIS — E11.9 CONTROLLED TYPE 2 DIABETES MELLITUS WITHOUT COMPLICATION, WITH LONG-TERM CURRENT USE OF INSULIN (HCC): Primary | ICD-10-CM

## 2020-02-18 RX ORDER — INSULIN ASPART 100 [IU]/ML
INJECTION, SOLUTION INTRAVENOUS; SUBCUTANEOUS
Qty: 30 ML | Refills: 11 | Status: SHIPPED | OUTPATIENT
Start: 2020-02-18 | End: 2021-03-22

## 2020-02-18 RX ORDER — INSULIN GLARGINE 100 [IU]/ML
INJECTION, SOLUTION SUBCUTANEOUS
Qty: 20 ML | Refills: 11
Start: 2020-02-18 | End: 2020-04-27

## 2020-02-18 NOTE — PROGRESS NOTES
Chief Complaint   Patient presents with    Diabetes     pcp and pharmacy confirmed    Other     eye exam is due     History of Present Illness: Ronaldo Jones is a 71 y.o. female here for follow up of diabetes. Weight down 7 lbs since last visit in 5/19. Just had her left knee replacement in 12/19 and has recovered from this. No major change to health since last visit. Has tapered her lantus from 50 down to 42 units at night for the past 4 months. Fasting sugars are as high as 225 but has seen as low as the 60s 2-3 times a month and under 50 about once a month possibly from taking too much novolog for dinner. Taking about 15-20 units of novolog for food but can decrease to 8-12 units for smaller meals. Still getting metformin bid. Doesn't have any data to review so it's difficult to determine any trends. Has been able to be more active and is in less pain since her knee surgery and unclear if this is leading to more lows. Did have a fall after coming home from the surgery and then hurt her back but this is now improving. Hasn't had any recent NSAIDs but has been taking some tylenol as needed and some hydrocodone and hydromorphone. Has been eating less with the pain meds. Compliant with BP and lipid regimen.     Current Outpatient Medications   Medication Sig    insulin aspart U-100 (NOVOLOG U-100 INSULIN ASPART) 100 unit/mL injection INJECT 10-20 UNITS SUBCUTANEOUSLY BEFORE MEALS--Dose change 2/18/20--updated med list--did not send prescription to the pharmacy    insulin glargine (LANTUS U-100 INSULIN) 100 unit/mL injection INJECT 42 UNITS SUBCUTANEOUSLY NIGHTLY OR AS DIRECTED UP TO 60 UNITS PER DAY--Dose change 2/18/20--updated med list--did not send prescription to the pharmacy    Insulin Syringe-Needle U-100 1 mL 31 gauge x 5/16 syrg Use as directed to inject insulin 4 times daily    ZETIA 10 mg tablet TAKE 1 TABLET BY MOUTH DAILY    metFORMIN (GLUCOPHAGE) 1,000 mg tablet TAKE 1 TABLET BY MOUTH TWICE DAILY WITH FOOD    BYSTOLIC 10 mg tablet TAKE 1 TABLET BY MOUTH TWO TIMES A DAY    FREESTYLE LITE STRIPS strip USE TO TEST BLOOD SUGAR UP TO 4 TIMES DAILY    HYDROcodone-acetaminophen (NORCO) 7.5-325 mg per tablet TAKE 1 TABLET BY MOUTH TWICE DAILY AS NEEDED    gabapentin (NEURONTIN) 800 mg tablet Take  by mouth three (3) times daily.  lisinopril (PRINIVIL, ZESTRIL) 40 mg tablet Take 40 mg by mouth daily.  hydroCHLOROthiazide (HYDRODIURIL) 12.5 mg tablet Take 25 mg by mouth daily.  isosorbide mononitrate ER (IMDUR) 30 mg tablet Take  by mouth daily.  ULTRA COMFORT INSULIN SYRINGE 1 mL 31 x 5/16\" syrg USE AS DIRECTED TO INJECT INSULIN DAILY    propranolol (INDERAL) 10 mg tablet Take  by mouth two (2) times a day.  OTHER,NON-FORMULARY, Plasamanex      1 po bid for blood thinning    Insulin Needles, Disposable, (BD INSULIN PEN NEEDLE UF SHORT) 31 X 5/16 \" Ndle Use as directed up to 4 times daily    CALCIUM CARBONATE/VITAMIN D3 (CALCIUM + D PO) Take  by mouth daily.  duloxetine (CYMBALTA) 60 mg capsule Take 60 mg by mouth daily.  trazodone (DESYREL) 100 mg tablet Take 100 mg by mouth nightly.  UBIDECARENONE/VITAMIN E MIXED (COQ10  PO) Take  by mouth daily.  aspirin delayed-release 81 mg tablet Take  by mouth daily.  ERGOCALCIFEROL, VITAMIN D2, (VITAMIN D PO) Take 4,000 Units by mouth. No current facility-administered medications for this visit.       Allergies   Allergen Reactions    Adhesive Tape-Silicones Rash    Compazine [Prochlorperazine Edisylate] Other (comments)    Gemfibrozil Other (comments)     Nausea, liver pain, bone pain    Inova [Benzoyl Peroxide] Other (comments)    Iodine Shortness of Breath, Rash and Swelling    Oxycontin [Oxycodone] Rash    Statins-Hmg-Coa Reductase Inhibitors Myalgia     With lipitor, crestor, and pravastatin    Wellbutrin [Bupropion Hcl] Other (comments)     Had homicidal thoughts     Review of Systems:  - Eyes: no blurry vision or double vision  - Cardiovascular: no chest pain  - Respiratory: no shortness of breath  - Musculoskeletal: no myalgias  - Neurological: no numbness/tingling in extremities    Physical Examination:  Blood pressure 128/56, pulse 74, height 5' 2\" (1.575 m), weight 183 lb 4.8 oz (83.1 kg). - General: pleasant, no distress, good eye contact   - Neck: no carotid bruits  - Cardiovascular: regular, normal rate, nl s1 and s2, no m/r/g,   - Respiratory: clear bilaterally  - Integumentary: no edema,   - Psychiatric: normal mood and affect    Data Reviewed:   Component      Latest Ref Rng & Units 2/14/2020 2/14/2020 2/14/2020          11:20 AM 11:20 AM 11:20 AM   Glucose      65 - 99 mg/dL  107 (H)    BUN      7 - 25 mg/dL  26 (H)    Creatinine      0.50 - 0.99 mg/dL  1.12 (H)    GFR est non-AA      > OR = 60 mL/min/1.73m2  50 (L)    GFR est AA      > OR = 60 mL/min/1.73m2  58 (L)    BUN/Creatinine ratio      6 - 22 (calc)  23 (H)    Sodium      135 - 146 mmol/L  138    Potassium      3.5 - 5.3 mmol/L  4.7    Chloride      98 - 110 mmol/L  102    CO2      20 - 32 mmol/L  31    Calcium      8.6 - 10.4 mg/dL  9.6    Protein, total      6.1 - 8.1 g/dL  6.3    Albumin      3.6 - 5.1 g/dL  4.2    Globulin      1.9 - 3.7 g/dL (calc)  2.1    ALB/GLOBRATIO      1.0 - 2.5 (calc)  2.0    Bilirubin, total      0.2 - 1.2 mg/dL  0.4    Alk. phosphatase      37 - 153 U/L  111    AST      10 - 35 U/L  45 (H)    ALT (SGPT)      6 - 29 U/L  31 (H)    Cholesterol, total      <200 mg/dL 171     HDL Cholesterol      > OR = 50 mg/dL 42 (L)     Triglyceride      <150 mg/dL 213 (H)     LDL-CHOLESTEROL      mg/dL (calc) 98     Cholesterol/HDL ratio      <5.0 (calc) 4.1     Non-HDL Cholesterol      <130 mg/dL (calc) 129     Hemoglobin A1c, (calculated)      <5.7 % of total Hgb   7.2 (H)   eAG (mg/dL)      (calc)   160   eAG (mmol/L)      (calc)   8.9       Assessment/Plan:     1.  DM w/o complication type II, controlled (250.00) her most recent Hgb A1c was 7.2% in 2/20 down from 7.4% in 5/19 up from 6.9% in 9/17 up from 5.8% in 9/16 down from 6.7% in 3/16 up from 6.3% in 9/15 down from 6.7% in 3/15 down from 6.8% in 9/14 up from 5.9% in 4/14 down 6.3% in 10/13 stable from June down from 6.4% in Feb 2013 up from 6.3% in October up from 6.1% in June down from 6.5% in March down from 6.7% in December 2011 down from 7% in September down from 7.2% in June down from 9.5% in March. Goal A1c is 7-7.5% so she is at goal but needs to fine tune her lantus and novolog and gave her parameters as below. - cont Lantus 42 units at bedtime and taper as below  - cont Novolog 10-20 units with meals plus 3 units for every 50 mg/dl above 150 mg/dl  - cont Metformin 1000 mg bid  - check bs 3 times per day  - foot exam done 5/19  - optho UTD spring 2014--due now  - microalbumin nl 5/19  - check Hgb A1c and cmp and microalbumin prior to next visit (send to [address])       2. Unspecified essential hypertension (401.9) her BP was at goal < 140/90  -  cont current regimen      3. Mixed hyperlipidemia: Given DM and CAD, Goal LDL < 70, non-HDL < 100, and TG < 150. Non- and LDL 78 on zetia in Oct 2012, up to 137 and 92 in 6/13 due to weight gain and 145 and 90 in 10/13. Down to 140 and 88 in 4/14 and 131 and 74 in 9/14 and 135 and 100 in 3/15 and 85 in 9/15 and 64 in 3/16 and 84 in 9/16. Up to 115 in 9/17 and 128 in 5/19. LDL 98 and  in 2/20. May consider PCSK9 inhibitor in the future. - cont zetia 10 mg daily  - check lipids prior to next visit       Patient Instructions   1) Try checking before dinner and bedtime and morning for the next 4 days and see if your dose of novolog at dinner is keeping your bedtime sugars between 130-180. If so, then you are at a safe level and the lantus should keep you between 100-150 by the next morning. If you are under 130 at bedtime then your novolog may be too much for dinner and if over 180 then not enough at dinner.     If overnight, you are dropping by more than 30 points then the lantus may be too much and you can cut back by 2 units as needed. 2) Your A1c is 7.2% which is down from 7.4% at the last check. Goal for you will be 7-7.5% going forward to you are currently at goal.    3) Your creatinine (kidney test) was slightly high due to mild dehydration. Drink at least 4 8oz glasses of water everyday to stay hydrated to prevent your creatinine level from going higher. 4) Your ALT and AST are markers of liver function. Your values are slightly up due to the pain meds and tylenol but I'm not too concerned. 5) Your blood pressure is very good and your cholesterol has been improved by 30-50 points on all parameters. Follow-up and Dispositions    · Return in about 6 months (around 8/18/2020).                Copy sent to:  Dr. Cuong Rivas Fax: [phone], Phone [phone]  Dr. Geoffrey Mello (cardiology at [facility] in [place])

## 2020-02-18 NOTE — PATIENT INSTRUCTIONS
1) Try checking before dinner and bedtime and morning for the next 4 days and see if your dose of novolog at dinner is keeping your bedtime sugars between 130-180. If so, then you are at a safe level and the lantus should keep you between 100-150 by the next morning. If you are under 130 at bedtime then your novolog may be too much for dinner and if over 180 then not enough at dinner. If overnight, you are dropping by more than 30 points then the lantus may be too much and you can cut back by 2 units as needed. 2) Your A1c is 7.2% which is down from 7.4% at the last check. Goal for you will be 7-7.5% going forward to you are currently at goal.    3) Your creatinine (kidney test) was slightly high due to mild dehydration. Drink at least 4 8oz glasses of water everyday to stay hydrated to prevent your creatinine level from going higher. 4) Your ALT and AST are markers of liver function. Your values are slightly up due to the pain meds and tylenol but I'm not too concerned. 5) Your blood pressure is very good and your cholesterol has been improved by 30-50 points on all parameters.

## 2020-02-20 DIAGNOSIS — Z79.4 CONTROLLED TYPE 2 DIABETES MELLITUS WITHOUT COMPLICATION, WITH LONG-TERM CURRENT USE OF INSULIN (HCC): ICD-10-CM

## 2020-02-20 DIAGNOSIS — E11.9 CONTROLLED TYPE 2 DIABETES MELLITUS WITHOUT COMPLICATION, WITH LONG-TERM CURRENT USE OF INSULIN (HCC): ICD-10-CM

## 2020-02-20 RX ORDER — BLOOD-GLUCOSE METER
KIT MISCELLANEOUS
Qty: 300 STRIP | Refills: 3 | Status: SHIPPED | OUTPATIENT
Start: 2020-02-20 | End: 2021-03-01

## 2020-04-27 RX ORDER — INSULIN GLARGINE 100 [IU]/ML
INJECTION, SOLUTION SUBCUTANEOUS
Qty: 20 ML | Refills: 11 | Status: SHIPPED | OUTPATIENT
Start: 2020-04-27 | End: 2020-08-18

## 2020-05-19 RX ORDER — NEBIVOLOL HYDROCHLORIDE 10 MG/1
TABLET ORAL
Qty: 180 TAB | Refills: 3 | Status: SHIPPED | OUTPATIENT
Start: 2020-05-19 | End: 2021-05-17

## 2020-06-23 RX ORDER — METFORMIN HYDROCHLORIDE 1000 MG/1
TABLET ORAL
Qty: 180 TAB | Refills: 3 | Status: SHIPPED | OUTPATIENT
Start: 2020-06-23 | End: 2020-06-30

## 2020-08-12 LAB
ALB/GLOBRATIO, 58C: 1.7 (CALC) (ref 1–2.5)
ALBUMIN SERPL-MCNC: 4.1 G/DL (ref 3.6–5.1)
ALKALINE PHOSPHATASE, TOTAL, 25002000: 61 U/L (ref 37–153)
ALT SERPL-CCNC: 20 U/L (ref 6–29)
AST SERPL W P-5'-P-CCNC: 15 U/L (ref 10–35)
BILIRUB SERPL-MCNC: 0.5 MG/DL (ref 0.2–1.2)
BUN SERPL-MCNC: 26 MG/DL (ref 7–25)
BUN/CREATININE RATIO,BUCR: 24 (CALC) (ref 6–22)
CALCIUM SERPL-MCNC: 9.9 MG/DL (ref 8.6–10.4)
CHLORIDE SERPL-SCNC: 100 MMOL/L (ref 98–110)
CHOL/HDL RATIO,CHHDX: 3.8 (CALC)
CHOLEST SERPL-MCNC: 205 MG/DL
CO2 SERPL-SCNC: 30 MMOL/L (ref 20–32)
CREAT SERPL-MCNC: 1.07 MG/DL (ref 0.6–0.93)
CREATININE URINE,9612018: 120 MG/DL (ref 20–275)
EAG (MG/DL),9916804: 163 (CALC)
EAG (MMOL/L),9916805: 9 (CALC)
GLOBULIN,GLOB: 2.4 G/DL (CALC) (ref 1.9–3.7)
GLUCOSE SERPL-MCNC: 160 MG/DL (ref 65–99)
HBA1C MFR BLD HPLC: 7.3 % OF TOTAL HGB
HDLC SERPL-MCNC: 54 MG/DL
LDL-CHOLESTEROL: 115 MG/DL (CALC)
MICROALBUMIN,URINE RANDOM 140054: 0.4 MG/DL
MICROALBUMIN/CREAT RATIO: 3 MCG/MG CREAT
NON-HDL CHOLESTEROL, 011976: 151 MG/DL (CALC)
POTASSIUM SERPL-SCNC: 4.9 MMOL/L (ref 3.5–5.3)
PROT SERPL-MCNC: 6.5 G/DL (ref 6.1–8.1)
SODIUM SERPL-SCNC: 136 MMOL/L (ref 135–146)
TRIGL SERPL-MCNC: 255 MG/DL (ref ?–150)

## 2020-08-18 ENCOUNTER — VIRTUAL VISIT (OUTPATIENT)
Dept: ENDOCRINOLOGY | Age: 70
End: 2020-08-18
Payer: MEDICARE

## 2020-08-18 DIAGNOSIS — E11.9 CONTROLLED TYPE 2 DIABETES MELLITUS WITHOUT COMPLICATION, WITH LONG-TERM CURRENT USE OF INSULIN (HCC): Primary | ICD-10-CM

## 2020-08-18 DIAGNOSIS — I10 ESSENTIAL HYPERTENSION, BENIGN: ICD-10-CM

## 2020-08-18 DIAGNOSIS — Z79.4 CONTROLLED TYPE 2 DIABETES MELLITUS WITHOUT COMPLICATION, WITH LONG-TERM CURRENT USE OF INSULIN (HCC): Primary | ICD-10-CM

## 2020-08-18 DIAGNOSIS — E78.2 MIXED HYPERLIPIDEMIA: ICD-10-CM

## 2020-08-18 PROCEDURE — 99214 OFFICE O/P EST MOD 30 MIN: CPT | Performed by: INTERNAL MEDICINE

## 2020-08-18 RX ORDER — PROPRANOLOL HYDROCHLORIDE 20 MG/1
20 TABLET ORAL EVERY MORNING
COMMUNITY
End: 2022-03-01

## 2020-08-18 RX ORDER — INSULIN GLARGINE 100 [IU]/ML
INJECTION, SOLUTION SUBCUTANEOUS
Qty: 20 ML | Refills: 11
Start: 2020-08-18 | End: 2021-02-16

## 2020-08-18 NOTE — PROGRESS NOTES
Chief Complaint   Patient presents with    Diabetes     pcp and pharmacy confirmed       **THIS IS A VIRTUAL VISIT VIA A VIDEO SYNCHRONOUS DISCUSSION. PATIENT AGREED TO HAVE THEIR CARE DELIVERED OVER A MYCHART VIDEO VISIT IN PLACE OF THEIR REGULARLY SCHEDULED OFFICE VISIT**    History of Present Illness: Fuentes Cabrera is a 79 y.o. female here for follow up of diabetes. She decreased her lantus from 42 units to 36 units. Thinks her weight is about the same to possibly lower. She decreased her lantus from 42 units to 36 units as she was having overnight lows and this has been better on the lower dose. Able to keep the majority of her sugars between 100-180. Has had some trouble with her ankle that has limited her ability to walk. Has been compliant with zetia and BP regimen. Will have vein closure procedure on the left leg in the next 2 weeks. May be having an increase in fatty foods in her diet to explain the rise in cholesterol. Current Outpatient Medications   Medication Sig    insulin glargine (Lantus U-100 Insulin) 100 unit/mL injection INJECT 36 UNITS SUBCUTANEOUSLY NIGHTLY OR AS DIRECTED **UP TO 60 UNITS PER DAY**--Dose change 8/18/20--updated med list--did not send prescription to the pharmacy    metFORMIN (GLUCOPHAGE) 1,000 mg tablet TAKE 1 TABLET BY MOUTH TWICE DAILY WITH FOOD    Bystolic 10 mg tablet TAKE 1 TABLET BY MOUTH TWICE DAILY    FREESTYLE LITE STRIPS strip Test sugar 3 times daily.   DX E11.65    insulin aspart U-100 (NOVOLOG U-100 INSULIN ASPART) 100 unit/mL injection INJECT 10-20 UNITS SUBCUTANEOUSLY BEFORE MEALS--Dose change 2/18/20--updated med list--did not send prescription to the pharmacy    Insulin Syringe-Needle U-100 1 mL 31 gauge x 5/16 syrg Use as directed to inject insulin 4 times daily    ZETIA 10 mg tablet TAKE 1 TABLET BY MOUTH DAILY    HYDROcodone-acetaminophen (NORCO) 7.5-325 mg per tablet TAKE 1 TABLET BY MOUTH TWICE DAILY AS NEEDED    gabapentin (NEURONTIN) 800 mg tablet Take  by mouth three (3) times daily.  lisinopril (PRINIVIL, ZESTRIL) 40 mg tablet Take 40 mg by mouth daily.  hydroCHLOROthiazide (HYDRODIURIL) 12.5 mg tablet Take 25 mg by mouth daily.  isosorbide mononitrate ER (IMDUR) 30 mg tablet Take  by mouth daily.  ULTRA COMFORT INSULIN SYRINGE 1 mL 31 x 5/16\" syrg USE AS DIRECTED TO INJECT INSULIN DAILY    OTHER,NON-FORMULARY, Plasamanex      1 po bid for blood thinning    Insulin Needles, Disposable, (BD INSULIN PEN NEEDLE UF SHORT) 31 X 5/16 \" Ndle Use as directed up to 4 times daily    CALCIUM CARBONATE/VITAMIN D3 (CALCIUM + D PO) Take  by mouth daily.  duloxetine (CYMBALTA) 60 mg capsule Take 60 mg by mouth daily.  trazodone (DESYREL) 100 mg tablet Take 100 mg by mouth nightly.  UBIDECARENONE/VITAMIN E MIXED (COQ10  PO) Take  by mouth daily.  aspirin delayed-release 81 mg tablet Take  by mouth daily.  ERGOCALCIFEROL, VITAMIN D2, (VITAMIN D PO) Take 4,000 Units by mouth daily.  propranoloL (INDERAL) 20 mg tablet Take 20 mg by mouth Every morning. No current facility-administered medications for this visit.       Allergies   Allergen Reactions    Adhesive Tape-Silicones Rash    Compazine [Prochlorperazine Edisylate] Other (comments)    Gemfibrozil Other (comments)     Nausea, liver pain, bone pain    Inova [Benzoyl Peroxide] Other (comments)    Iodine Shortness of Breath, Rash and Swelling    Oxycontin [Oxycodone] Rash    Statins-Hmg-Coa Reductase Inhibitors Myalgia     With lipitor, crestor, and pravastatin    Wellbutrin [Bupropion Hcl] Other (comments)     Had homicidal thoughts     Review of Systems: PER HPI    Physical Examination:  - GENERAL: NCAT, Appears well nourished   - EYES: EOMI, non-icteric, no proptosis   - Ear/Nose/Throat: NCAT, no visible inflammation or masses   - CARDIOVASCULAR: no cyanosis, no visible JVD   - RESPIRATORY: respiratory effort normal without any distress or labored breathing - MUSCULOSKELETAL: Normal ROM of neck and upper extremities observed   - SKIN: No rash on face  - NEUROLOGIC:  No facial asymmetry (Cranial nerve 7 motor function), No gaze palsy   - PSYCHIATRIC: Normal affect, Normal insight and judgement       Data Reviewed:   Component      Latest Ref Rng & Units 8/11/2020 8/11/2020 8/11/2020 8/11/2020           2:50 PM  2:50 PM  2:50 PM  2:50 PM   Glucose      65 - 99 mg/dL  160 (H)     BUN      7 - 25 mg/dL  26 (H)     Creatinine      0.60 - 0.93 mg/dL  1.07 (H)     GFR est non-AA      > OR = 60 mL/min/1.73m2  53 (L)     GFR est AA      > OR = 60 mL/min/1.73m2  61     BUN/Creatinine ratio      6 - 22 (calc)  24 (H)     Sodium      135 - 146 mmol/L  136     Potassium      3.5 - 5.3 mmol/L  4.9     Chloride      98 - 110 mmol/L  100     CO2      20 - 32 mmol/L  30     Calcium      8.6 - 10.4 mg/dL  9.9     Protein, total      6.1 - 8.1 g/dL  6.5     Albumin      3.6 - 5.1 g/dL  4.1     Globulin      1.9 - 3.7 g/dL (calc)  2.4     ALB/GLOBRATIO      1.0 - 2.5 (calc)  1.7     Bilirubin, total      0.2 - 1.2 mg/dL  0.5     Alkaline Phosphatase, total      37 - 153 U/L  61     AST      10 - 35 U/L  15     ALT      6 - 29 U/L  20     Cholesterol, total      <200 mg/dL 205 (H)      HDL Cholesterol      > OR = 50 mg/dL 54      Triglyceride      <150 mg/dL 255 (H)      LDL-CHOLESTEROL      mg/dL (calc) 115 (H)      Cholesterol/HDL ratio      <5.0 (calc) 3.8      Non-HDL Cholesterol      <130 mg/dL (calc) 151 (H)      Hemoglobin A1c, (calculated)      <5.7 % of total Hgb    7.3 (H)   eAG (mg/dL)      (calc)    163   eAG (mmol/L)      (calc)    9.0   Creatinine Urine      20 - 275 mg/dL   120    Microalbumin, urine      mg/dL   0.4    Microalbumin/Creat Ratio      <30 mcg/mg creat   3        Assessment/Plan:     1.  DM w/o complication type II, controlled (250.00) her most recent Hgb A1c was 7.3% in 8/20 up from 7.2% in 2/20 down from 7.4% in 5/19 up from 6.9% in 9/17 up from 5.8% in 9/16 down from 6.7% in 3/16 up from 6.3% in 9/15 down from 6.7% in 3/15 down from 6.8% in 9/14 up from 5.9% in 4/14 down 6.3% in 10/13 stable from June down from 6.4% in Feb 2013 up from 6.3% in October up from 6.1% in June down from 6.5% in March down from 6.7% in December 2011 down from 7% in September down from 7.2% in June down from 9.5% in March. Goal A1c is 7-7.5% so she is at goal so didn't make any changes. - cont Lantus 36 units at bedtime   - cont Novolog 10-20 units with meals plus 3 units for every 50 mg/dl above 150 mg/dl  - cont Metformin 1000 mg bid  - check bs 3 times per day  - foot exam done 5/19  - optho UTD spring 2014--due now  - microalbumin nl 8/20  - check Hgb A1c and cmp prior to next visit (send to [email or web address])       2. Unspecified essential hypertension (401.9) her BP was at goal < 140/90  -  cont current regimen      3. Mixed hyperlipidemia: Given DM and CAD, Goal LDL < 70, non-HDL < 100, and TG < 150. Non- and LDL 78 on zetia in Oct 2012, up to 137 and 92 in 6/13 due to weight gain and 145 and 90 in 10/13. Down to 140 and 88 in 4/14 and 131 and 74 in 9/14 and 135 and 100 in 3/15 and 85 in 9/15 and 64 in 3/16 and 84 in 9/16. Up to 115 in 9/17 and 128 in 5/19. LDL 98 and  in 2/20.  and  in 8/20. May consider PCSK9 inhibitor in the future.   - cont zetia 10 mg daily  - check lipids prior to next visit            Follow-up and Dispositions    · Return 2/16/21 at 2:10pm.             Copy sent to:  Dr. Olesya Stallings Fax: [phone], Phone [phone]  Dr. Guzman Bean (cardiology at [facility] in [place])

## 2020-10-13 RX ORDER — METFORMIN HYDROCHLORIDE 1000 MG/1
TABLET ORAL
Qty: 180 TAB | Refills: 3 | Status: SHIPPED | OUTPATIENT
Start: 2020-10-13 | End: 2021-10-28 | Stop reason: SDUPTHER

## 2020-12-08 RX ORDER — EZETIMIBE 10 MG
TABLET ORAL
Qty: 30 TAB | Refills: 11 | Status: SHIPPED | OUTPATIENT
Start: 2020-12-08 | End: 2021-12-08

## 2021-02-01 DIAGNOSIS — Z79.4 CONTROLLED TYPE 2 DIABETES MELLITUS WITHOUT COMPLICATION, WITH LONG-TERM CURRENT USE OF INSULIN (HCC): ICD-10-CM

## 2021-02-01 DIAGNOSIS — I10 ESSENTIAL HYPERTENSION, BENIGN: ICD-10-CM

## 2021-02-01 DIAGNOSIS — E11.9 CONTROLLED TYPE 2 DIABETES MELLITUS WITHOUT COMPLICATION, WITH LONG-TERM CURRENT USE OF INSULIN (HCC): ICD-10-CM

## 2021-02-01 DIAGNOSIS — E78.2 MIXED HYPERLIPIDEMIA: ICD-10-CM

## 2021-02-09 LAB
ALB/GLOBRATIO, 58C: 1.9 (CALC) (ref 1–2.5)
ALBUMIN SERPL-MCNC: 4.1 G/DL (ref 3.6–5.1)
ALKALINE PHOSPHATASE, TOTAL, 25002000: 50 U/L (ref 37–153)
ALT SERPL-CCNC: 24 U/L (ref 6–29)
AST SERPL W P-5'-P-CCNC: 18 U/L (ref 10–35)
BILIRUB SERPL-MCNC: 0.3 MG/DL (ref 0.2–1.2)
BUN SERPL-MCNC: 20 MG/DL (ref 7–25)
BUN/CREATININE RATIO,BUCR: 21 (CALC) (ref 6–22)
CALCIUM SERPL-MCNC: 9.8 MG/DL (ref 8.6–10.4)
CHLORIDE SERPL-SCNC: 103 MMOL/L (ref 98–110)
CHOL/HDL RATIO,CHHDX: 3.7 (CALC)
CHOLEST SERPL-MCNC: 174 MG/DL
CO2 SERPL-SCNC: 28 MMOL/L (ref 20–32)
CREAT SERPL-MCNC: 0.94 MG/DL (ref 0.6–0.93)
EAG (MG/DL),9916804: 163 (CALC)
EAG (MMOL/L),9916805: 9 (CALC)
GLOBULIN,GLOB: 2.2 G/DL (CALC) (ref 1.9–3.7)
GLUCOSE SERPL-MCNC: 141 MG/DL (ref 65–99)
HBA1C MFR BLD HPLC: 7.3 % OF TOTAL HGB
HDLC SERPL-MCNC: 47 MG/DL
LDL-CHOLESTEROL: 83 MG/DL (CALC)
NON-HDL CHOLESTEROL, 011976: 127 MG/DL (CALC)
POTASSIUM SERPL-SCNC: 4.5 MMOL/L (ref 3.5–5.3)
PROT SERPL-MCNC: 6.3 G/DL (ref 6.1–8.1)
SODIUM SERPL-SCNC: 137 MMOL/L (ref 135–146)
TRIGL SERPL-MCNC: 362 MG/DL (ref ?–150)

## 2021-02-16 ENCOUNTER — VIRTUAL VISIT (OUTPATIENT)
Dept: ENDOCRINOLOGY | Age: 71
End: 2021-02-16
Payer: MEDICARE

## 2021-02-16 DIAGNOSIS — I10 ESSENTIAL HYPERTENSION, BENIGN: ICD-10-CM

## 2021-02-16 DIAGNOSIS — E11.9 CONTROLLED TYPE 2 DIABETES MELLITUS WITHOUT COMPLICATION, WITH LONG-TERM CURRENT USE OF INSULIN (HCC): Primary | ICD-10-CM

## 2021-02-16 DIAGNOSIS — E78.2 MIXED HYPERLIPIDEMIA: ICD-10-CM

## 2021-02-16 DIAGNOSIS — Z79.4 CONTROLLED TYPE 2 DIABETES MELLITUS WITHOUT COMPLICATION, WITH LONG-TERM CURRENT USE OF INSULIN (HCC): Primary | ICD-10-CM

## 2021-02-16 PROCEDURE — 99214 OFFICE O/P EST MOD 30 MIN: CPT | Performed by: INTERNAL MEDICINE

## 2021-02-16 RX ORDER — MULTIVIT WITH MINERALS/HERBS
1 TABLET ORAL DAILY
COMMUNITY

## 2021-02-16 RX ORDER — INSULIN GLARGINE 100 [IU]/ML
INJECTION, SOLUTION SUBCUTANEOUS
Qty: 20 ML | Refills: 11
Start: 2021-02-16 | End: 2021-05-24

## 2021-02-16 RX ORDER — DULOXETIN HYDROCHLORIDE 20 MG/1
CAPSULE, DELAYED RELEASE ORAL
Qty: 60 CAP | Refills: 11 | Status: SHIPPED | OUTPATIENT
Start: 2021-02-16 | End: 2021-03-08 | Stop reason: SDUPTHER

## 2021-02-16 RX ORDER — HYDROCODONE BITARTRATE AND ACETAMINOPHEN 5; 325 MG/1; MG/1
1 TABLET ORAL
COMMUNITY
Start: 2021-02-12 | End: 2021-04-13

## 2021-02-16 NOTE — PATIENT INSTRUCTIONS
1) I will send 20 mg cymbalta caps to the pharmacy and you can try taking 2 caps on Mon, Wed, and Fri and one of the 60 mg cap on the other 4 days and if you are having less sweating, then stay on this dose but if still having sweating after 2 weeks, then decrease to 2 of the 20 mg caps everyday. 2) Your Hemoglobin A1c (3 month test of blood sugar) remains at goal of 7-7.5%. 3) Your cholesterol has improved. 4) Your creatinine (kidney test) is almost back to normal. 
 
5) ALT and AST are markers of liver function. Your values are normal. 
 
6) Please come for a follow up visit on 8/24/21 at 2:10pm in our [place] office. 7) I will mail you a lab slip. Please use this to have your labs drawn in the 1-2 weeks prior to your next visit.

## 2021-02-16 NOTE — PROGRESS NOTES
Chief Complaint   Patient presents with   [address] [place] [address] Diabetes     [phone] doxy-Long    Other     pcp and pharmacy confirmed       **THIS IS A VIRTUAL VISIT VIA A VIDEO SYNCHRONOUS DISCUSSION. PATIENT AGREED TO HAVE THEIR CARE DELIVERED OVER A [employer] VIDEO VISIT IN PLACE OF THEIR REGULARLY SCHEDULED OFFICE VISIT**    History of Present Illness: Jeppie Peabody is a 79 y.o. female here for follow up of diabetes. Still having neuropathy despite adding the b-complex vitamin. Having a lot of sweating and unclear if this is from the cymbalta and is willing to try and taper down on this as listed below. Was having higher readings in the 150-200 range so she increased her lantus back to 40 units daily about a month ago. Has been taking 12-18 units of novolog but mostly around 14 with meals. Can get fasting sugars under 130 at times but other times is over 150. Still taking metformin 1g bid. Trying to watch her diet more closely as her brother who is 6 years younger was just diagnosed with diabetes. Current Outpatient Medications   Medication Sig    HYDROcodone-acetaminophen (NORCO) 5-325 mg per tablet Take 1 Tab by mouth every eight (8) hours as needed.  b complex vitamins tablet Take 1 Tab by mouth daily.  insulin glargine (Lantus U-100 Insulin) 100 unit/mL injection INJECT 40 UNITS SUBCUTANEOUSLY NIGHTLY OR AS DIRECTED **UP TO 60 UNITS PER DAY**--Dose change 2/16/21--updated med list--did not send prescription to the pharmacy    Zetia 10 mg tablet TAKE 1 TABLET BY MOUTH DAILY    metFORMIN (GLUCOPHAGE) 1,000 mg tablet TAKE 1 TABLET BY MOUTH TWICE DAILY WITH FOOD    propranoloL (INDERAL) 20 mg tablet Take 20 mg by mouth Every morning.  Bystolic 10 mg tablet TAKE 1 TABLET BY MOUTH TWICE DAILY    FREESTYLE LITE STRIPS strip Test sugar 3 times daily.   DX E11.65    insulin aspart U-100 (NOVOLOG U-100 INSULIN ASPART) 100 unit/mL injection INJECT 10-20 UNITS SUBCUTANEOUSLY BEFORE MEALS--Dose change 2/18/20--updated med list--did not send prescription to the pharmacy    Insulin Syringe-Needle U-100 1 mL 31 gauge x 5/16 syrg Use as directed to inject insulin 4 times daily    gabapentin (NEURONTIN) 800 mg tablet Take  by mouth three (3) times daily.  lisinopril (PRINIVIL, ZESTRIL) 40 mg tablet Take 40 mg by mouth daily.  hydroCHLOROthiazide (HYDRODIURIL) 12.5 mg tablet Take 25 mg by mouth daily.  isosorbide mononitrate ER (IMDUR) 30 mg tablet Take  by mouth daily.  ULTRA COMFORT INSULIN SYRINGE 1 mL 31 x 5/16\" syrg USE AS DIRECTED TO INJECT INSULIN DAILY    Insulin Needles, Disposable, (BD INSULIN PEN NEEDLE UF SHORT) 31 X 5/16 \" Ndle Use as directed up to 4 times daily    CALCIUM CARBONATE/VITAMIN D3 (CALCIUM + D PO) Take  by mouth daily.  duloxetine (CYMBALTA) 60 mg capsule Take 60 mg by mouth daily.  trazodone (DESYREL) 100 mg tablet Take 100 mg by mouth nightly.  UBIDECARENONE/VITAMIN E MIXED (COQ10  PO) Take  by mouth daily.  aspirin delayed-release 81 mg tablet Take  by mouth daily.  ERGOCALCIFEROL, VITAMIN D2, (VITAMIN D PO) Take 4,000 Units by mouth daily. No current facility-administered medications for this visit.       Allergies   Allergen Reactions    Adhesive Tape-Silicones Rash    Compazine [Prochlorperazine Edisylate] Other (comments)    Gemfibrozil Other (comments)     Nausea, liver pain, bone pain    Inova [Benzoyl Peroxide] Other (comments)    Iodine Shortness of Breath, Rash and Swelling    Oxycontin [Oxycodone] Rash    Statins-Hmg-Coa Reductase Inhibitors Myalgia     With lipitor, crestor, and pravastatin    Wellbutrin [Bupropion Hcl] Other (comments)     Had homicidal thoughts     Review of Systems: PER HPI    Physical Examination:  - GENERAL: NCAT, Appears well nourished   - EYES: EOMI, non-icteric, no proptosis   - Ear/Nose/Throat: NCAT, no visible inflammation or masses   - CARDIOVASCULAR: no cyanosis, no visible JVD   - RESPIRATORY: respiratory effort normal without any distress or labored breathing   - MUSCULOSKELETAL: Normal ROM of neck and upper extremities observed   - SKIN: No rash on face  - NEUROLOGIC:  No facial asymmetry (Cranial nerve 7 motor function), No gaze palsy   - PSYCHIATRIC: Normal affect, Normal insight and judgement       Data Reviewed:   Component      Latest Ref Rng & Units 2/8/2021 2/8/2021 2/8/2021           2:15 PM  2:15 PM  2:15 PM   Glucose      65 - 99 mg/dL  141 (H)    BUN      7 - 25 mg/dL  20    Creatinine      0.60 - 0.93 mg/dL  0.94 (H)    GFR est non-AA      > OR = 60 mL/min/1.73m2  61    GFR est AA      > OR = 60 mL/min/1.73m2  71    BUN/Creatinine ratio      6 - 22 (calc)  21    Sodium      135 - 146 mmol/L  137    Potassium      3.5 - 5.3 mmol/L  4.5    Chloride      98 - 110 mmol/L  103    CO2      20 - 32 mmol/L  28    Calcium      8.6 - 10.4 mg/dL  9.8    Protein, total      6.1 - 8.1 g/dL  6.3    Albumin      3.6 - 5.1 g/dL  4.1    Globulin      1.9 - 3.7 g/dL (calc)  2.2    ALB/GLOBRATIO      1.0 - 2.5 (calc)  1.9    Bilirubin, total      0.2 - 1.2 mg/dL  0.3    Alkaline Phosphatase, total      37 - 153 U/L  50    AST      10 - 35 U/L  18    ALT      6 - 29 U/L  24    Cholesterol, total      <200 mg/dL 174     HDL Cholesterol      > OR = 50 mg/dL 47 (L)     Triglyceride      <150 mg/dL 362 (H)     LDL-CHOLESTEROL      mg/dL (calc) 83     Cholesterol/HDL ratio      <5.0 (calc) 3.7     Non-HDL Cholesterol      <130 mg/dL (calc) 127     Hemoglobin A1c, (calculated)      <5.7 % of total Hgb   7.3 (H)   eAG (mg/dL)      (calc)   163   eAG (mmol/L)      (calc)   9.0       Assessment/Plan:     1. DM w/o complication type II, controlled (250.00) her most recent Hgb A1c was 7.3% in 2/21 stable from 8/20 up from 7.2% in 2/20 down from 7.4% in 5/19 up from 6.9% in 9/17 up from 5.8% in 9/16 down from 6.7% in 3/16 up from 6.3% in 9/15 down from 6.7% in 3/15 down from 6.8% in 9/14 up from 5.9% in 4/14 down 6.3% in  10/13 stable from June down from 6.4% in Feb 2013 up from 6.3% in October up from 6.1% in June down from 6.5% in March down from 6.7% in December 2011 down from 7% in September down from 7.2% in June down from 9.5% in March. Goal A1c is 7-7.5% so she is at goal so didn't make any changes. - cont Lantus 40 units at bedtime   - cont Novolog 10-20 units with meals plus 3 units for every 50 mg/dl above 150 mg/dl  - cont Metformin 1000 mg bid  - check bs 3 times per day  - foot exam done 5/19  - optho UTD spring 2014--due now  - microalbumin nl 8/20  - check Hgb A1c and cmp and microalbumin prior to next visit (send to [address])       2. Unspecified essential hypertension (401.9) her BP was at goal < 140/90  -  cont current regimen      3. Mixed hyperlipidemia: Given DM and CAD, Goal LDL < 70, non-HDL < 100, and TG < 150. Non- and LDL 78 on zetia in Oct 2012, up to 137 and 92 in 6/13 due to weight gain and 145 and 90 in 10/13. Down to 140 and 88 in 4/14 and 131 and 74 in 9/14 and 135 and 100 in 3/15 and 85 in 9/15 and 64 in 3/16 and 84 in 9/16. Up to 115 in 9/17 and 128 in 5/19. LDL 98 and  in 2/20.  and  in 8/20. LDL 83 and  in 2/21. May consider PCSK9 inhibitor in the future. - cont zetia 10 mg daily  - check lipids prior to next visit       Patient Instructions   1) I will send 20 mg cymbalta caps to the pharmacy and you can try taking 2 caps on Mon, Wed, and Fri and one of the 60 mg cap on the other 4 days and if you are having less sweating, then stay on this dose but if still having sweating after 2 weeks, then decrease to 2 of the 20 mg caps everyday. 2) Your Hemoglobin A1c (3 month test of blood sugar) remains at goal of 7-7.5%. 3) Your cholesterol has improved. 4) Your creatinine (kidney test) is almost back to normal.    5) ALT and AST are markers of liver function.   Your values are normal.    6) Please come for a follow up visit on 8/24/21 at 2:10pm in our [place] office. 7) I will mail you a lab slip. Please use this to have your labs drawn in the 1-2 weeks prior to your next visit.           Follow-up and Dispositions    · Return 8/24/21 at 2:10pm.               Copy sent to:  Dr. Jeanne Smith Fax: [phone], Phone [phone]  Dr. Joe Delgado (cardiology at [facility] in [place])

## 2021-03-01 DIAGNOSIS — E11.9 CONTROLLED TYPE 2 DIABETES MELLITUS WITHOUT COMPLICATION, WITH LONG-TERM CURRENT USE OF INSULIN (HCC): ICD-10-CM

## 2021-03-01 DIAGNOSIS — Z79.4 CONTROLLED TYPE 2 DIABETES MELLITUS WITHOUT COMPLICATION, WITH LONG-TERM CURRENT USE OF INSULIN (HCC): ICD-10-CM

## 2021-03-01 RX ORDER — BLOOD-GLUCOSE METER
KIT MISCELLANEOUS
Qty: 300 STRIP | Refills: 3 | Status: SHIPPED | OUTPATIENT
Start: 2021-03-01 | End: 2022-05-31 | Stop reason: SDUPTHER

## 2021-03-08 RX ORDER — DULOXETIN HYDROCHLORIDE 20 MG/1
CAPSULE, DELAYED RELEASE ORAL
Qty: 60 CAP | Refills: 11 | Status: SHIPPED | OUTPATIENT
Start: 2021-03-08 | End: 2022-03-14

## 2021-03-22 RX ORDER — INSULIN ASPART 100 [IU]/ML
INJECTION, SOLUTION INTRAVENOUS; SUBCUTANEOUS
Qty: 30 ML | Refills: 11 | Status: SHIPPED | OUTPATIENT
Start: 2021-03-22 | End: 2022-04-07

## 2021-04-27 RX ORDER — CALCIUM CARB/VITAMIN D3/VIT K1 500-100-40
TABLET,CHEWABLE ORAL
Qty: 400 SYRINGE | Refills: 3 | Status: SHIPPED | OUTPATIENT
Start: 2021-04-27 | End: 2022-05-26 | Stop reason: SDUPTHER

## 2021-05-17 RX ORDER — NEBIVOLOL HYDROCHLORIDE 10 MG/1
TABLET ORAL
Qty: 180 TAB | Refills: 3 | Status: SHIPPED | OUTPATIENT
Start: 2021-05-17 | End: 2022-03-01

## 2021-05-24 RX ORDER — INSULIN GLARGINE 100 [IU]/ML
INJECTION, SOLUTION SUBCUTANEOUS
Qty: 20 ML | Refills: 11 | Status: SHIPPED | OUTPATIENT
Start: 2021-05-24 | End: 2022-03-01

## 2021-08-03 PROBLEM — I10 UNSPECIFIED ESSENTIAL HYPERTENSION: Status: RESOLVED | Noted: 2021-08-03 | Resolved: 2021-08-03

## 2021-08-10 DIAGNOSIS — E78.2 MIXED HYPERLIPIDEMIA: ICD-10-CM

## 2021-08-10 DIAGNOSIS — I10 ESSENTIAL HYPERTENSION, BENIGN: ICD-10-CM

## 2021-08-10 DIAGNOSIS — E11.9 CONTROLLED TYPE 2 DIABETES MELLITUS WITHOUT COMPLICATION, WITH LONG-TERM CURRENT USE OF INSULIN (HCC): ICD-10-CM

## 2021-08-10 DIAGNOSIS — Z79.4 CONTROLLED TYPE 2 DIABETES MELLITUS WITHOUT COMPLICATION, WITH LONG-TERM CURRENT USE OF INSULIN (HCC): ICD-10-CM

## 2021-08-23 ENCOUNTER — TELEPHONE (OUTPATIENT)
Dept: ENDOCRINOLOGY | Age: 71
End: 2021-08-23

## 2021-08-23 NOTE — TELEPHONE ENCOUNTER
Pt stated that she is in the ER. She stated that she had a heart attack and will give us a call to reschedule her appointment for tomorrow.

## 2021-08-24 NOTE — TELEPHONE ENCOUNTER
Sent her the following message through [employer]:    I received a message you had to cancel your appointment today as you were in the ER and had a heart attack. I'm so sorry to hear this. Please let me know when you are released and I can try to squeeze you in for a sooner visit than the next available which is currently in February.

## 2021-09-01 ENCOUNTER — TELEPHONE (OUTPATIENT)
Dept: ENDOCRINOLOGY | Age: 71
End: 2021-09-01

## 2021-09-01 NOTE — TELEPHONE ENCOUNTER
Mrs Ashleigh Adan called to inform you that she just got home from having a double bypass surgery and her medications have changed. She states that they have changed the dosage of her insulin. She would like a call back to discuss this.

## 2021-09-03 NOTE — TELEPHONE ENCOUNTER
Called and spoke with pt. She was released from the hospital after 2v CABG and initially was being given very low doses of insulin but since being home she went back to her old doses now that she is eating normally and her sugars are doing well. She accepted an appointment with me on 9/23/21 at 10:30am and will plan on having her labs drawn prior to the visit at [address] [place] [address] and will let me know if she can't find her lab slip.

## 2021-10-28 RX ORDER — METFORMIN HYDROCHLORIDE 1000 MG/1
TABLET ORAL
Qty: 180 TABLET | Refills: 3 | Status: SHIPPED | OUTPATIENT
Start: 2021-10-28

## 2021-12-08 RX ORDER — EZETIMIBE 10 MG
TABLET ORAL
Qty: 90 TABLET | Refills: 3 | Status: SHIPPED | OUTPATIENT
Start: 2021-12-08

## 2022-03-01 ENCOUNTER — OFFICE VISIT (OUTPATIENT)
Dept: ENDOCRINOLOGY | Age: 72
End: 2022-03-01
Payer: MEDICARE

## 2022-03-01 VITALS
WEIGHT: 192.4 LBS | BODY MASS INDEX: 35.41 KG/M2 | SYSTOLIC BLOOD PRESSURE: 191 MMHG | DIASTOLIC BLOOD PRESSURE: 95 MMHG | HEIGHT: 62 IN | HEART RATE: 62 BPM

## 2022-03-01 DIAGNOSIS — I10 ESSENTIAL HYPERTENSION, BENIGN: ICD-10-CM

## 2022-03-01 DIAGNOSIS — Z79.4 CONTROLLED TYPE 2 DIABETES MELLITUS WITHOUT COMPLICATION, WITH LONG-TERM CURRENT USE OF INSULIN (HCC): Primary | ICD-10-CM

## 2022-03-01 DIAGNOSIS — E11.9 CONTROLLED TYPE 2 DIABETES MELLITUS WITHOUT COMPLICATION, WITH LONG-TERM CURRENT USE OF INSULIN (HCC): Primary | ICD-10-CM

## 2022-03-01 DIAGNOSIS — E78.2 MIXED HYPERLIPIDEMIA: ICD-10-CM

## 2022-03-01 LAB — HBA1C MFR BLD HPLC: 6.1 %

## 2022-03-01 PROCEDURE — 83036 HEMOGLOBIN GLYCOSYLATED A1C: CPT | Performed by: INTERNAL MEDICINE

## 2022-03-01 PROCEDURE — G8427 DOCREV CUR MEDS BY ELIG CLIN: HCPCS | Performed by: INTERNAL MEDICINE

## 2022-03-01 PROCEDURE — 2022F DILAT RTA XM EVC RTNOPTHY: CPT | Performed by: INTERNAL MEDICINE

## 2022-03-01 PROCEDURE — G8755 DIAS BP > OR = 90: HCPCS | Performed by: INTERNAL MEDICINE

## 2022-03-01 PROCEDURE — 99214 OFFICE O/P EST MOD 30 MIN: CPT | Performed by: INTERNAL MEDICINE

## 2022-03-01 PROCEDURE — G8417 CALC BMI ABV UP PARAM F/U: HCPCS | Performed by: INTERNAL MEDICINE

## 2022-03-01 PROCEDURE — G8432 DEP SCR NOT DOC, RNG: HCPCS | Performed by: INTERNAL MEDICINE

## 2022-03-01 PROCEDURE — 3044F HG A1C LEVEL LT 7.0%: CPT | Performed by: INTERNAL MEDICINE

## 2022-03-01 PROCEDURE — 1101F PT FALLS ASSESS-DOCD LE1/YR: CPT | Performed by: INTERNAL MEDICINE

## 2022-03-01 PROCEDURE — G8536 NO DOC ELDER MAL SCRN: HCPCS | Performed by: INTERNAL MEDICINE

## 2022-03-01 PROCEDURE — G8400 PT W/DXA NO RESULTS DOC: HCPCS | Performed by: INTERNAL MEDICINE

## 2022-03-01 PROCEDURE — 3017F COLORECTAL CA SCREEN DOC REV: CPT | Performed by: INTERNAL MEDICINE

## 2022-03-01 PROCEDURE — 1090F PRES/ABSN URINE INCON ASSESS: CPT | Performed by: INTERNAL MEDICINE

## 2022-03-01 PROCEDURE — G8753 SYS BP > OR = 140: HCPCS | Performed by: INTERNAL MEDICINE

## 2022-03-01 RX ORDER — INSULIN GLARGINE 100 [IU]/ML
INJECTION, SOLUTION SUBCUTANEOUS
Qty: 20 ML | Refills: 11
Start: 2022-03-01 | End: 2022-06-06

## 2022-03-01 RX ORDER — METOPROLOL SUCCINATE 100 MG/1
100 TABLET, EXTENDED RELEASE ORAL DAILY
COMMUNITY
Start: 2021-11-26 | End: 2022-04-21

## 2022-03-01 RX ORDER — LISINOPRIL 20 MG/1
20 TABLET ORAL DAILY
Qty: 90 TABLET | Refills: 3 | Status: SHIPPED | OUTPATIENT
Start: 2022-03-01 | End: 2022-03-19

## 2022-03-01 RX ORDER — LISINOPRIL 10 MG/1
TABLET ORAL DAILY
COMMUNITY
End: 2022-03-01

## 2022-03-01 RX ORDER — INSULIN GLARGINE 100 [IU]/ML
INJECTION, SOLUTION SUBCUTANEOUS
Qty: 20 ML | Refills: 11
Start: 2022-03-01 | End: 2022-03-01

## 2022-03-01 NOTE — PATIENT INSTRUCTIONS
1) Decrease the lantus to 32 units at bedtime and see if this keeps your fasting sugars between . If you still have 2 or more readings under 90 in a week, then decrease your lantus by 2 units every week to keep your sugars over 90 in the morning. 2) Your A1c is very good at 6.1% down from 7.3% in 2/21 but I'm concerned about the frequency of lows and would prefer your A1c to be closer to 7-7.5%. Stay on your 10-16 units of novolog but if you are getting lows 2-3 hours after eating, then you need less novolog for the meals. 3) Stay on the metoprolol 100 mg daily but move your pill from morning to bedtime starting tomorrow night. Tonight, plan on taking one more dose of 10 mg of lisinopril and then starting tomorrow morning, take 2 of the 10 mg tabs together every morning to use up your supply. I sent 20 mg lisinopril tabs to the pharmacy to take 1 tab in the morning when you use up the 10 mg tabs. 4) Start monitoring blood pressure about 2-3 times per week at alternating times either in the morning or evening after resting for 5 minutes and sitting upright in a chair with your arm at heart level. Please let me know if you are having readings over 140 on the top number or 90 on the bottom number after 2 weeks on this new regimen. 5)  I will send you a message through [employer] with your lab results.

## 2022-03-05 LAB
ALB/GLOBRATIO, 58C: 2 (CALC) (ref 1–2.5)
ALBUMIN SERPL-MCNC: 4.3 G/DL (ref 3.6–5.1)
ALKALINE PHOSPHATASE, TOTAL, 25002000: 94 U/L (ref 37–153)
ALT SERPL-CCNC: 30 U/L (ref 6–29)
AST SERPL W P-5'-P-CCNC: 20 U/L (ref 10–35)
BILIRUB SERPL-MCNC: 0.5 MG/DL (ref 0.2–1.2)
BUN SERPL-MCNC: 20 MG/DL (ref 7–25)
BUN/CREATININE RATIO,BUCR: 18 (CALC) (ref 6–22)
CALCIUM SERPL-MCNC: 10.1 MG/DL (ref 8.6–10.4)
CHLORIDE SERPL-SCNC: 101 MMOL/L (ref 98–110)
CHOL/HDL RATIO,CHHDX: 3 (CALC)
CHOLEST SERPL-MCNC: 176 MG/DL
CO2 SERPL-SCNC: 30 MMOL/L (ref 20–32)
CREAT SERPL-MCNC: 1.14 MG/DL (ref 0.6–0.93)
CREATININE URINE,9612018: 180 MG/DL (ref 20–275)
GLOBULIN,GLOB: 2.2 G/DL (CALC) (ref 1.9–3.7)
GLUCOSE SERPL-MCNC: 147 MG/DL (ref 65–99)
HDLC SERPL-MCNC: 59 MG/DL
LDL-CHOLESTEROL: 89 MG/DL (CALC)
MICROALBUMIN,URINE RANDOM 140054: 2.3 MG/DL
MICROALBUMIN/CREAT RATIO: 13 MCG/MG CREAT
NON-HDL CHOLESTEROL, 011976: 117 MG/DL (CALC)
POTASSIUM SERPL-SCNC: 4.8 MMOL/L (ref 3.5–5.3)
PROT SERPL-MCNC: 6.5 G/DL (ref 6.1–8.1)
SODIUM SERPL-SCNC: 138 MMOL/L (ref 135–146)
TRIGL SERPL-MCNC: 181 MG/DL (ref ?–150)

## 2022-03-14 RX ORDER — DULOXETIN HYDROCHLORIDE 20 MG/1
CAPSULE, DELAYED RELEASE ORAL
Qty: 180 CAPSULE | Refills: 3 | Status: SHIPPED | OUTPATIENT
Start: 2022-03-14

## 2022-03-18 PROBLEM — E11.21 TYPE 2 DIABETES WITH NEPHROPATHY (HCC): Status: ACTIVE | Noted: 2020-02-18

## 2022-03-19 PROBLEM — E11.40 TYPE 2 DIABETES MELLITUS WITH DIABETIC NEUROPATHY (HCC): Status: ACTIVE | Noted: 2020-02-18

## 2022-03-19 RX ORDER — LISINOPRIL 40 MG/1
40 TABLET ORAL DAILY
Qty: 90 TABLET | Refills: 3 | Status: SHIPPED | OUTPATIENT
Start: 2022-03-19

## 2022-04-07 RX ORDER — INSULIN ASPART 100 [IU]/ML
INJECTION, SOLUTION INTRAVENOUS; SUBCUTANEOUS
Qty: 30 ML | Refills: 11 | Status: SHIPPED | OUTPATIENT
Start: 2022-04-07

## 2022-04-21 RX ORDER — METOPROLOL SUCCINATE 100 MG/1
150 TABLET, EXTENDED RELEASE ORAL
COMMUNITY
Start: 2022-04-21 | End: 2022-05-10

## 2022-05-10 RX ORDER — METOPROLOL SUCCINATE 200 MG/1
200 TABLET, EXTENDED RELEASE ORAL
Qty: 90 TABLET | Refills: 3 | Status: SHIPPED | OUTPATIENT
Start: 2022-05-10

## 2022-05-10 RX ORDER — HYDROCHLOROTHIAZIDE 25 MG/1
25 TABLET ORAL DAILY
Qty: 90 TABLET | Refills: 3 | Status: SHIPPED | OUTPATIENT
Start: 2022-05-10

## 2022-05-26 RX ORDER — CALCIUM CARB/VITAMIN D3/VIT K1 500-100-40
TABLET,CHEWABLE ORAL
Qty: 400 EACH | Refills: 3 | Status: SHIPPED | OUTPATIENT
Start: 2022-05-26

## 2022-05-30 DIAGNOSIS — E11.9 CONTROLLED TYPE 2 DIABETES MELLITUS WITHOUT COMPLICATION, WITH LONG-TERM CURRENT USE OF INSULIN (HCC): ICD-10-CM

## 2022-05-30 DIAGNOSIS — Z79.4 CONTROLLED TYPE 2 DIABETES MELLITUS WITHOUT COMPLICATION, WITH LONG-TERM CURRENT USE OF INSULIN (HCC): ICD-10-CM

## 2022-05-31 RX ORDER — BLOOD-GLUCOSE METER
KIT MISCELLANEOUS
Qty: 300 STRIP | Refills: 3 | Status: SHIPPED | OUTPATIENT
Start: 2022-05-31

## 2022-06-06 RX ORDER — INSULIN GLARGINE 100 [IU]/ML
INJECTION, SOLUTION SUBCUTANEOUS
Qty: 20 ML | Refills: 11 | Status: SHIPPED | OUTPATIENT
Start: 2022-06-06

## 2022-06-16 RX ORDER — HYDRALAZINE HYDROCHLORIDE 25 MG/1
25 TABLET, FILM COATED ORAL 2 TIMES DAILY
Qty: 180 TABLET | Refills: 3 | Status: SHIPPED | OUTPATIENT
Start: 2022-06-16

## 2022-08-17 DIAGNOSIS — I10 ESSENTIAL HYPERTENSION, BENIGN: ICD-10-CM

## 2022-08-17 DIAGNOSIS — E78.2 MIXED HYPERLIPIDEMIA: ICD-10-CM

## 2022-08-17 DIAGNOSIS — Z79.4 CONTROLLED TYPE 2 DIABETES MELLITUS WITHOUT COMPLICATION, WITH LONG-TERM CURRENT USE OF INSULIN (HCC): ICD-10-CM

## 2022-08-17 DIAGNOSIS — E11.9 CONTROLLED TYPE 2 DIABETES MELLITUS WITHOUT COMPLICATION, WITH LONG-TERM CURRENT USE OF INSULIN (HCC): ICD-10-CM

## 2022-09-24 LAB
ALB/GLOBRATIO, 58C: 2 (CALC) (ref 1–2.5)
ALBUMIN SERPL-MCNC: 4.3 G/DL (ref 3.6–5.1)
ALKALINE PHOSPHATASE, TOTAL, 25002000: 69 U/L (ref 37–153)
ALT SERPL-CCNC: 22 U/L (ref 6–29)
AST SERPL W P-5'-P-CCNC: 16 U/L (ref 10–35)
BILIRUB SERPL-MCNC: 0.5 MG/DL (ref 0.2–1.2)
BUN SERPL-MCNC: 28 MG/DL (ref 7–25)
BUN/CREATININE RATIO,BUCR: 25 (CALC) (ref 6–22)
CALCIUM SERPL-MCNC: 9.9 MG/DL (ref 8.6–10.4)
CHLORIDE SERPL-SCNC: 99 MMOL/L (ref 98–110)
CHOL/HDL RATIO,CHHDX: 3.8 (CALC)
CHOLEST SERPL-MCNC: 176 MG/DL
CO2 SERPL-SCNC: 29 MMOL/L (ref 20–32)
CREAT SERPL-MCNC: 1.14 MG/DL (ref 0.6–1)
EAG (MG/DL),9916804: 114 MG/DL
EAG (MMOL/L),9916805: 6.3 MMOL/L
EGFR: 51 ML/MIN/1.73M2
GLOBULIN,GLOB: 2.2 G/DL (CALC) (ref 1.9–3.7)
GLUCOSE SERPL-MCNC: 121 MG/DL (ref 65–99)
HBA1C MFR BLD HPLC: 5.6 % OF TOTAL HGB
HDLC SERPL-MCNC: 46 MG/DL
LDL-CHOLESTEROL: 93 MG/DL (CALC)
NON-HDL CHOLESTEROL, 011976: 130 MG/DL (CALC)
POTASSIUM SERPL-SCNC: 5.1 MMOL/L (ref 3.5–5.3)
PROT SERPL-MCNC: 6.5 G/DL (ref 6.1–8.1)
SODIUM SERPL-SCNC: 136 MMOL/L (ref 135–146)
TRIGL SERPL-MCNC: 259 MG/DL (ref ?–150)

## 2022-09-27 ENCOUNTER — OFFICE VISIT (OUTPATIENT)
Dept: ENDOCRINOLOGY | Age: 72
End: 2022-09-27
Payer: MEDICARE

## 2022-09-27 VITALS
HEIGHT: 62 IN | DIASTOLIC BLOOD PRESSURE: 72 MMHG | HEART RATE: 67 BPM | SYSTOLIC BLOOD PRESSURE: 148 MMHG | WEIGHT: 189.6 LBS | BODY MASS INDEX: 34.89 KG/M2

## 2022-09-27 DIAGNOSIS — I10 ESSENTIAL HYPERTENSION, BENIGN: ICD-10-CM

## 2022-09-27 DIAGNOSIS — E78.2 MIXED HYPERLIPIDEMIA: ICD-10-CM

## 2022-09-27 DIAGNOSIS — Z79.4 CONTROLLED TYPE 2 DIABETES MELLITUS WITHOUT COMPLICATION, WITH LONG-TERM CURRENT USE OF INSULIN (HCC): Primary | ICD-10-CM

## 2022-09-27 DIAGNOSIS — E11.9 CONTROLLED TYPE 2 DIABETES MELLITUS WITHOUT COMPLICATION, WITH LONG-TERM CURRENT USE OF INSULIN (HCC): Primary | ICD-10-CM

## 2022-09-27 PROCEDURE — 3044F HG A1C LEVEL LT 7.0%: CPT | Performed by: INTERNAL MEDICINE

## 2022-09-27 PROCEDURE — G8536 NO DOC ELDER MAL SCRN: HCPCS | Performed by: INTERNAL MEDICINE

## 2022-09-27 PROCEDURE — G8432 DEP SCR NOT DOC, RNG: HCPCS | Performed by: INTERNAL MEDICINE

## 2022-09-27 PROCEDURE — G8400 PT W/DXA NO RESULTS DOC: HCPCS | Performed by: INTERNAL MEDICINE

## 2022-09-27 PROCEDURE — 1090F PRES/ABSN URINE INCON ASSESS: CPT | Performed by: INTERNAL MEDICINE

## 2022-09-27 PROCEDURE — 99214 OFFICE O/P EST MOD 30 MIN: CPT | Performed by: INTERNAL MEDICINE

## 2022-09-27 PROCEDURE — 1123F ACP DISCUSS/DSCN MKR DOCD: CPT | Performed by: INTERNAL MEDICINE

## 2022-09-27 PROCEDURE — G8417 CALC BMI ABV UP PARAM F/U: HCPCS | Performed by: INTERNAL MEDICINE

## 2022-09-27 PROCEDURE — 2022F DILAT RTA XM EVC RTNOPTHY: CPT | Performed by: INTERNAL MEDICINE

## 2022-09-27 PROCEDURE — G8754 DIAS BP LESS 90: HCPCS | Performed by: INTERNAL MEDICINE

## 2022-09-27 PROCEDURE — 1101F PT FALLS ASSESS-DOCD LE1/YR: CPT | Performed by: INTERNAL MEDICINE

## 2022-09-27 PROCEDURE — G8427 DOCREV CUR MEDS BY ELIG CLIN: HCPCS | Performed by: INTERNAL MEDICINE

## 2022-09-27 PROCEDURE — 3017F COLORECTAL CA SCREEN DOC REV: CPT | Performed by: INTERNAL MEDICINE

## 2022-09-27 PROCEDURE — G8753 SYS BP > OR = 140: HCPCS | Performed by: INTERNAL MEDICINE

## 2022-09-27 NOTE — PATIENT INSTRUCTIONS
1) Your Hemoglobin A1c (3 month test of blood sugar) is very good at 5.6% and because you are not having lows under 90 more than 2 times a week, I'll leave your insulin the same. If you have 2 or more readings under 90 in a week, then decrease your lantus insulin by 2 units every week. 2) Your creatinine (kidney test) is stable. 3) Your triglycerides (short term fats) were a little higher based on what you ate the night before. 4) ALT and AST are markers of liver function. Your values are normal.    5) Your blood pressure was up slightly today but has been better at home so won't make any changes.

## 2022-09-27 NOTE — PROGRESS NOTES
Chief Complaint   Patient presents with    Diabetes     PCP and pharmacy confirmed     History of Present Illness: Lopez Novak is a 67 y.o. female here for follow up of diabetes. Weight down 3 lbs since last visit in 3/22. Was started on primidone for essential tremor by her neurologist 2 weeks ago and only took doses for 1 week and felt more tearful and thought she was dying so she called doctor and he advised her to stop this and her symptoms have improved but her tremors have worsened off this. I had helped adjust her BP regimen and added hydralazine this summer and this is keeping her home BP readings under 140/90. Still taking lantus 32 units at night and novolog 16 units before meals and fasting sugars are in the  range and during the day is mostly staying under 150. Has only had 2 readings under 90 over the past 6 months so her A1c of 5.6% is reflecting very good control and not too many lows. Her PCP retired in 6/22 and will be seeing his partner, Dr. Perez. Current Outpatient Medications   Medication Sig    hydrALAZINE (APRESOLINE) 25 mg tablet Take 1 Tablet by mouth two (2) times a day. insulin glargine (Lantus U-100 Insulin) 100 unit/mL injection INJECT 32 UNITS SUBCUTANEOUSLY NIGHTLY OR AS DIRECTED **(UP TO 60 UNITS PER DAY)    FreeStyle Lite Strips strip USE TO TEST SUGAR THREE TIMES DAILY    Insulin Syringe-Needle U-100 1 mL 31 gauge x 5/16 syrg USE AS DIRECTED TO INJECT INSULIN 4 TIMES DAILY    metoprolol succinate (TOPROL-XL) 200 mg XL tablet Take 1 Tablet by mouth nightly. Delete the 100 mg tabs from profile    hydroCHLOROthiazide (HYDRODIURIL) 25 mg tablet Take 1 Tablet by mouth daily. insulin aspart U-100 (NovoLOG U-100 Insulin aspart) 100 unit/mL injection INJECT 10-20 UNITS SUBCUTANEOUSLY BEFORE MEALS (Patient taking differently: INJECT 16 UNITS SUBCUTANEOUSLY BEFORE MEALS)    lisinopriL (PRINIVIL, ZESTRIL) 40 mg tablet Take 1 Tablet by mouth daily.  Delete the 20 mg dose from profile    DULoxetine (CYMBALTA) 20 mg capsule TAKE 2 CAPSULES BY MOUTH EVERY DAY (STOP TAKING THE 60MG CAPSULES)    Zetia 10 mg tablet TAKE 1 TABLET BY MOUTH EVERY DAY    metFORMIN (GLUCOPHAGE) 1,000 mg tablet TAKE 1 TABLET BY MOUTH TWICE DAILY WITH FOOD    b complex vitamins tablet Take 1 Tab by mouth daily. gabapentin (NEURONTIN) 800 mg tablet Take  by mouth three (3) times daily. ULTRA COMFORT INSULIN SYRINGE 1 mL 31 x 5/16\" syrg USE AS DIRECTED TO INJECT INSULIN DAILY    Insulin Needles, Disposable, (BD INSULIN PEN NEEDLE UF SHORT) 31 X 5/16 \" Ndle Use as directed up to 4 times daily    CALCIUM CARBONATE/VITAMIN D3 (CALCIUM + D PO) Take  by mouth daily. trazodone (DESYREL) 100 mg tablet Take 100 mg by mouth nightly. UBIDECARENONE/VITAMIN E MIXED (COQ10  PO) Take  by mouth daily. aspirin delayed-release 81 mg tablet Take  by mouth daily. ERGOCALCIFEROL, VITAMIN D2, (VITAMIN D PO) Take 8,000 Units by mouth daily. No current facility-administered medications for this visit. Allergies   Allergen Reactions    Adhesive Tape-Silicones Rash    Compazine [Prochlorperazine Edisylate] Other (comments)    Fish Oil Other (comments)     Shortness of breath, rash and nausea    Gemfibrozil Other (comments)     Nausea, liver pain, bone pain    Inova [Benzoyl Peroxide] Other (comments)    Iodine Shortness of Breath, Rash and Swelling    Oxycontin [Oxycodone] Rash    Statins-Hmg-Coa Reductase Inhibitors Myalgia     With lipitor, crestor, and pravastatin    Wellbutrin [Bupropion Hcl] Other (comments)     Had homicidal thoughts     Review of Systems: PER HPI    Physical Examination:  Blood pressure (!) 148/72, pulse 67, height 5' 2\" (1.575 m), weight 189 lb 9.6 oz (86 kg).   General: pleasant, no distress, good eye contact   Neck: no carotid bruits  Cardiovascular: regular, normal rate, nl s1 and s2, no m/r/g,   Respiratory: clear bilaterally  Integumentary: no edema,   Psychiatric: normal mood and affect    Data Reviewed:   Component      Latest Ref Rng & Units 9/23/2022   Glucose      65 - 99 mg/dL 121 (H)   BUN      7 - 25 mg/dL 28 (H)   Creatinine      0.60 - 1.00 mg/dL 1.14 (H)   eGFR      > OR = 60 mL/min/1.73m2 51 (L)   BUN/Creatinine ratio      6 - 22 (calc) 25 (H)   Sodium      135 - 146 mmol/L 136   Potassium      3.5 - 5.3 mmol/L 5.1   Chloride      98 - 110 mmol/L 99   CO2      20 - 32 mmol/L 29   Calcium      8.6 - 10.4 mg/dL 9.9   Protein, total      6.1 - 8.1 g/dL 6.5   Albumin      3.6 - 5.1 g/dL 4.3   Globulin      1.9 - 3.7 g/dL (calc) 2.2   ALB/GLOBRATIO      1.0 - 2.5 (calc) 2.0   Bilirubin, total      0.2 - 1.2 mg/dL 0.5   Alkaline Phosphatase, total      37 - 153 U/L 69   AST      10 - 35 U/L 16   ALT      6 - 29 U/L 22   Cholesterol, total      <200 mg/dL 176   HDL Cholesterol      > OR = 50 mg/dL 46 (L)   Triglyceride      <150 mg/dL 259 (H)   LDL-CHOLESTEROL      mg/dL (calc) 93   Cholesterol/HDL ratio      <5.0 (calc) 3.8   Non-HDL Cholesterol      <130 mg/dL (calc) 130 (H)   Hemoglobin A1c, (calculated)      <5.7 % of total Hgb 5.6   eAG (mg/dL)      mg/dL 114   eAG (mmol/L)      mmol/L 6.3       Assessment/Plan:     1. DM w/o complication type II, controlled (250.00) her most recent Hgb A1c was 5.6% in 9/22 down from 6.1% in 3/22 down from 7.3% in 2/21 stable from 8/20 up from 7.2% in 2/20 down from 7.4% in 5/19 up from 6.9% in 9/17 up from 5.8% in 9/16 down from 6.7% in 3/16 up from 6.3% in 9/15 down from 6.7% in 3/15 down from 6.8% in 9/14 up from 5.9% in 4/14 down 6.3% in 10/13 stable from June down from 6.4% in Feb 2013 up from 6.3% in October up from 6.1% in June down from 6.5% in March down from 6.7% in December 2011 down from 7% in September down from 7.2% in June down from 9.5% in March. Goal A1c is 7-7.5% so she is below goal but not having significant hypoglycemia so kept doses the same.   - cont Lantus 32 units at bedtime   - cont Novolog 10-16 units with meals plus 3 units for every 50 mg/dl above 150 mg/dl  - cont Metformin 1000 mg bid  - check bs 3 times per day  - foot exam done 3/22  - optho UTD spring 2014--due now  - microalbumin nl 3/22  - check A1c and cmp and microalbumin prior to next visit       2. Unspecified essential hypertension (401.9) her BP was above goal < 140/90 but home readings at goal.  - cont toprol  mg at bedtime  - cont lisinopril 40 mg daily  - cont hctz 25 mg daily  - cont hydralazine 25 mg bid  - monitor home blood pressure readings       3. Mixed hyperlipidemia: Given DM and CAD, Goal LDL < 70, non-HDL < 100, and TG < 150. Non- and LDL 78 on zetia in Oct 2012, up to 137 and 92 in 6/13 due to weight gain and 145 and 90 in 10/13. Down to 140 and 88 in 4/14 and 131 and 74 in 9/14 and 135 and 100 in 3/15 and 85 in 9/15 and 64 in 3/16 and 84 in 9/16. Up to 115 in 9/17 and 128 in 5/19. LDL 98 and  in 2/20.  and  in 8/20. LDL 83 and  in 2/21. LDL 89 and  in 3/22 and LDL 93 and  in 9/22. May consider PCSK9 inhibitor in the future. - cont zetia 10 mg daily  - check lipids now and prior to next visit       Patient Instructions   1) Your Hemoglobin A1c (3 month test of blood sugar) is very good at 5.6% and because you are not having lows under 90 more than 2 times a week, I'll leave your insulin the same. If you have 2 or more readings under 90 in a week, then decrease your lantus insulin by 2 units every week. 2) Your creatinine (kidney test) is stable. 3) Your triglycerides (short term fats) were a little higher based on what you ate the night before. 4) ALT and AST are markers of liver function. Your values are normal.    5) Your blood pressure was up slightly today but has been better at home so won't make any changes. Follow-up and Dispositions    Return in about 6 months (around 3/27/2023).                Copy sent to:  Dr. Pennie Drake Close Fax: [phone], Phone [phone]  Dr. Kelly Machado (cardiology at [facility] in [place])

## 2022-11-23 RX ORDER — METFORMIN HYDROCHLORIDE 1000 MG/1
TABLET ORAL
Qty: 180 TABLET | Refills: 3 | Status: SHIPPED | OUTPATIENT
Start: 2022-11-23

## 2022-12-06 RX ORDER — EZETIMIBE 10 MG
TABLET ORAL
Qty: 90 TABLET | Refills: 3 | Status: SHIPPED | OUTPATIENT
Start: 2022-12-06

## 2023-03-05 LAB
ALB/GLOBRATIO, 58C: 2 (CALC) (ref 1–2.5)
ALBUMIN SERPL-MCNC: 4 G/DL (ref 3.6–5.1)
ALKALINE PHOSPHATASE, TOTAL, 25002000: 65 U/L (ref 37–153)
ALT SERPL-CCNC: 20 U/L (ref 6–29)
AST SERPL W P-5'-P-CCNC: 18 U/L (ref 10–35)
BILIRUB SERPL-MCNC: 0.4 MG/DL (ref 0.2–1.2)
BUN SERPL-MCNC: 25 MG/DL (ref 7–25)
BUN/CREATININE RATIO,BUCR: 18 (CALC) (ref 6–22)
CALCIUM SERPL-MCNC: 9.6 MG/DL (ref 8.6–10.4)
CHLORIDE SERPL-SCNC: 100 MMOL/L (ref 98–110)
CHOL/HDL RATIO,CHHDX: 3.7 (CALC)
CHOLEST SERPL-MCNC: 169 MG/DL
CO2 SERPL-SCNC: 29 MMOL/L (ref 20–32)
CREAT SERPL-MCNC: 1.39 MG/DL (ref 0.6–1)
CREATININE URINE,9612018: 247 MG/DL (ref 20–275)
EAG (MG/DL),9916804: 120 MG/DL
EAG (MMOL/L),9916805: 6.6 MMOL/L
EGFR: 40 ML/MIN/1.73M2
GLOBULIN,GLOB: 2 G/DL (CALC) (ref 1.9–3.7)
GLUCOSE SERPL-MCNC: 110 MG/DL (ref 65–99)
HBA1C MFR BLD HPLC: 5.8 % OF TOTAL HGB
HDLC SERPL-MCNC: 46 MG/DL
LDL-CHOLESTEROL: 89 MG/DL (CALC)
MICROALBUMIN,URINE RANDOM 140054: 2 MG/DL
MICROALBUMIN/CREAT RATIO: 8 MCG/MG CREAT
NON-HDL CHOLESTEROL, 011976: 123 MG/DL (CALC)
POTASSIUM SERPL-SCNC: 5.1 MMOL/L (ref 3.5–5.3)
PROT SERPL-MCNC: 6 G/DL (ref 6.1–8.1)
SODIUM SERPL-SCNC: 138 MMOL/L (ref 135–146)
TRIGL SERPL-MCNC: 256 MG/DL (ref ?–150)

## 2023-03-10 DIAGNOSIS — Z79.4 CONTROLLED TYPE 2 DIABETES MELLITUS WITHOUT COMPLICATION, WITH LONG-TERM CURRENT USE OF INSULIN (HCC): ICD-10-CM

## 2023-03-10 DIAGNOSIS — E11.9 CONTROLLED TYPE 2 DIABETES MELLITUS WITHOUT COMPLICATION, WITH LONG-TERM CURRENT USE OF INSULIN (HCC): ICD-10-CM

## 2023-03-10 DIAGNOSIS — E78.2 MIXED HYPERLIPIDEMIA: ICD-10-CM

## 2023-03-10 DIAGNOSIS — I10 ESSENTIAL HYPERTENSION, BENIGN: ICD-10-CM

## 2023-03-24 RX ORDER — LISINOPRIL 40 MG/1
TABLET ORAL
Qty: 90 TABLET | Refills: 3 | Status: SHIPPED | OUTPATIENT
Start: 2023-03-24

## 2023-03-27 ENCOUNTER — OFFICE VISIT (OUTPATIENT)
Dept: ENDOCRINOLOGY | Age: 73
End: 2023-03-27
Payer: MEDICARE

## 2023-03-27 VITALS
DIASTOLIC BLOOD PRESSURE: 64 MMHG | BODY MASS INDEX: 35.7 KG/M2 | HEART RATE: 72 BPM | WEIGHT: 194 LBS | HEIGHT: 62 IN | SYSTOLIC BLOOD PRESSURE: 122 MMHG

## 2023-03-27 DIAGNOSIS — Z79.4 CONTROLLED TYPE 2 DIABETES MELLITUS WITHOUT COMPLICATION, WITH LONG-TERM CURRENT USE OF INSULIN (HCC): Primary | ICD-10-CM

## 2023-03-27 DIAGNOSIS — E11.9 CONTROLLED TYPE 2 DIABETES MELLITUS WITHOUT COMPLICATION, WITH LONG-TERM CURRENT USE OF INSULIN (HCC): Primary | ICD-10-CM

## 2023-03-27 DIAGNOSIS — I10 ESSENTIAL HYPERTENSION, BENIGN: ICD-10-CM

## 2023-03-27 DIAGNOSIS — E78.2 MIXED HYPERLIPIDEMIA: ICD-10-CM

## 2023-03-27 PROCEDURE — G8400 PT W/DXA NO RESULTS DOC: HCPCS | Performed by: INTERNAL MEDICINE

## 2023-03-27 PROCEDURE — 3078F DIAST BP <80 MM HG: CPT | Performed by: INTERNAL MEDICINE

## 2023-03-27 PROCEDURE — 1090F PRES/ABSN URINE INCON ASSESS: CPT | Performed by: INTERNAL MEDICINE

## 2023-03-27 PROCEDURE — 1101F PT FALLS ASSESS-DOCD LE1/YR: CPT | Performed by: INTERNAL MEDICINE

## 2023-03-27 PROCEDURE — G8536 NO DOC ELDER MAL SCRN: HCPCS | Performed by: INTERNAL MEDICINE

## 2023-03-27 PROCEDURE — 2022F DILAT RTA XM EVC RTNOPTHY: CPT | Performed by: INTERNAL MEDICINE

## 2023-03-27 PROCEDURE — 3017F COLORECTAL CA SCREEN DOC REV: CPT | Performed by: INTERNAL MEDICINE

## 2023-03-27 PROCEDURE — 3074F SYST BP LT 130 MM HG: CPT | Performed by: INTERNAL MEDICINE

## 2023-03-27 PROCEDURE — 99214 OFFICE O/P EST MOD 30 MIN: CPT | Performed by: INTERNAL MEDICINE

## 2023-03-27 PROCEDURE — 3044F HG A1C LEVEL LT 7.0%: CPT | Performed by: INTERNAL MEDICINE

## 2023-03-27 PROCEDURE — G8432 DEP SCR NOT DOC, RNG: HCPCS | Performed by: INTERNAL MEDICINE

## 2023-03-27 PROCEDURE — 1123F ACP DISCUSS/DSCN MKR DOCD: CPT | Performed by: INTERNAL MEDICINE

## 2023-03-27 PROCEDURE — G8417 CALC BMI ABV UP PARAM F/U: HCPCS | Performed by: INTERNAL MEDICINE

## 2023-03-27 PROCEDURE — G8427 DOCREV CUR MEDS BY ELIG CLIN: HCPCS | Performed by: INTERNAL MEDICINE

## 2023-03-27 RX ORDER — DULOXETIN HYDROCHLORIDE 30 MG/1
30 CAPSULE, DELAYED RELEASE ORAL
Qty: 90 CAPSULE | Refills: 3 | Status: SHIPPED | OUTPATIENT
Start: 2023-03-27

## 2023-03-27 RX ORDER — METFORMIN HYDROCHLORIDE 1000 MG/1
TABLET ORAL
Qty: 180 TABLET | Refills: 3
Start: 2023-03-27

## 2023-03-27 NOTE — PATIENT INSTRUCTIONS
1) Decrease the metformin to 1/2 tab in the morning and 1 tab in the evening to see if this helps with diarrhea. 2) Change the duloxetine (cymbalta) to 30 mg caps to take 1 cap at bedtime for neuropathy and hopefully this won't cause as much sweating as 2 of the 20 mg caps. 3) Your cholesterol was slightly better but your triglycerides (short term fats) are still high. 4) Your creatinine (kidney test) was higher possibly from motrin and the diarrhea so we'll repeat next time. 5) Microalbumin/creatinine ratio is a marker of the amount of protein in your urine. Goal is less than 30. Your value is normal. This indicates that your kidneys are not being affected by your diabetes and/or blood pressure.

## 2023-03-27 NOTE — PROGRESS NOTES
Chief Complaint   Patient presents with    Diabetes     PCP and pharmacy confirmed      History of Present Illness: Kulwinder Solano is a 67 y.o. female here for follow up of diabetes. Weight up 5 lbs since last visit in 9/22. She reached out to me about getting started on Dexcom last month but was also working on getting new hearing aids at the same time and ended up pursuing the hearing aids first and will be back in touch with me if she wants to pursue the dexcom. Has stayed on 32 units of lantus at night and doing 16 units of novolog before meals and metformin 1g bid. Fasting sugars are in the  range. Rarely may have a low under 80 in the morning. During the day is staying in the 110-120s. Rarely is over 200. Home BP readings are staying under 140/90. Her creatinine may have been higher due to some diarrhea and aleve prior to the lab draw. Does get diarrhea regularly so we'll decrease her metformin to help. Did drop the cymbalta down to 20 mg as she was having more sweating on 40 mg but her neuropathy is worse so we agreed to try 30 mg. Compliant with BP and lipid regimen. Current Outpatient Medications   Medication Sig    lisinopriL (PRINIVIL, ZESTRIL) 40 mg tablet TAKE 1 TABLET BY MOUTH DAILY    Zetia 10 mg tablet TAKE 1 TABLET BY MOUTH EVERY DAY    metFORMIN (GLUCOPHAGE) 1,000 mg tablet TAKE 1 TABLET BY MOUTH TWICE DAILY WITH FOOD    hydrALAZINE (APRESOLINE) 25 mg tablet Take 1 Tablet by mouth two (2) times a day. insulin glargine (Lantus U-100 Insulin) 100 unit/mL injection INJECT 32 UNITS SUBCUTANEOUSLY NIGHTLY OR AS DIRECTED **(UP TO 60 UNITS PER DAY)    FreeStyle Lite Strips strip USE TO TEST SUGAR THREE TIMES DAILY    Insulin Syringe-Needle U-100 1 mL 31 gauge x 5/16 syrg USE AS DIRECTED TO INJECT INSULIN 4 TIMES DAILY    metoprolol succinate (TOPROL-XL) 200 mg XL tablet Take 1 Tablet by mouth nightly.  Delete the 100 mg tabs from profile    hydroCHLOROthiazide (HYDRODIURIL) 25 mg tablet Take 1 Tablet by mouth daily. insulin aspart U-100 (NovoLOG U-100 Insulin aspart) 100 unit/mL injection INJECT 10-20 UNITS SUBCUTANEOUSLY BEFORE MEALS (Patient taking differently: INJECT 16 UNITS SUBCUTANEOUSLY BEFORE MEALS)    DULoxetine (CYMBALTA) 20 mg capsule TAKE 2 CAPSULES BY MOUTH EVERY DAY (STOP TAKING THE 60MG CAPSULES)    b complex vitamins tablet Take 1 Tab by mouth daily. gabapentin (NEURONTIN) 800 mg tablet Take  by mouth three (3) times daily. CALCIUM CARBONATE/VITAMIN D3 (CALCIUM + D PO) Take  by mouth daily. trazodone (DESYREL) 100 mg tablet Take 100 mg by mouth nightly. UBIDECARENONE/VITAMIN E MIXED (COQ10  PO) Take  by mouth daily. aspirin delayed-release 81 mg tablet Take  by mouth daily. ERGOCALCIFEROL, VITAMIN D2, (VITAMIN D PO) Take 8,000 Units by mouth daily. No current facility-administered medications for this visit. Allergies   Allergen Reactions    Adhesive Tape-Silicones Rash    Compazine [Prochlorperazine Edisylate] Other (comments)    Fish Oil Other (comments)     Shortness of breath, rash and nausea    Gemfibrozil Other (comments)     Nausea, liver pain, bone pain    Inova [Benzoyl Peroxide] Other (comments)    Iodine Shortness of Breath, Rash and Swelling    Oxycontin [Oxycodone] Rash    Statins-Hmg-Coa Reductase Inhibitors Myalgia     With lipitor, crestor, and pravastatin    Wellbutrin [Bupropion Hcl] Other (comments)     Had homicidal thoughts     Review of Systems: PER HPI    Physical Examination:  Blood pressure 122/64, pulse 72, height 5' 2\" (1.575 m), weight 194 lb (88 kg).   General: pleasant, no distress, good eye contact   Neck: no carotid bruits  Cardiovascular: regular, normal rate, nl s1 and s2, no m/r/g,   Respiratory: clear bilaterally  Integumentary: no edema,   Psychiatric: normal mood and affect    Diabetic foot exam:     Left Foot:   Visual Exam: callous - mild   Pulse DP: 2+ (normal)   Filament test: absent sensation Vibratory sensation: absent      Right Foot:   Visual Exam: callous - mild   Pulse DP: 2+ (normal)   Filament test: absent sensation    Vibratory sensation: absent      Data Reviewed:   Component      Latest Ref Rng & Units 3/4/2023 3/4/2023 3/4/2023 3/4/2023          11:30 AM 11:30 AM 11:30 AM 11:30 AM   Glucose      65 - 99 mg/dL  110 (H)     BUN      7 - 25 mg/dL  25     Creatinine      0.60 - 1.00 mg/dL  1.39 (H)     eGFR      > OR = 60 mL/min/1.73m2  40 (L)     BUN/Creatinine ratio      6 - 22 (calc)  18     Sodium      135 - 146 mmol/L  138     Potassium      3.5 - 5.3 mmol/L  5.1     Chloride      98 - 110 mmol/L  100     CO2      20 - 32 mmol/L  29     Calcium      8.6 - 10.4 mg/dL  9.6     Protein, total      6.1 - 8.1 g/dL  6.0 (L)     Albumin      3.6 - 5.1 g/dL  4.0     Globulin      1.9 - 3.7 g/dL (calc)  2.0     ALB/GLOBRATIO      1.0 - 2.5 (calc)  2.0     Bilirubin, total      0.2 - 1.2 mg/dL  0.4     Alkaline Phosphatase, total      37 - 153 U/L  65     AST      10 - 35 U/L  18     ALT      6 - 29 U/L  20     Cholesterol, total      <200 mg/dL   169    HDL Cholesterol      > OR = 50 mg/dL   46 (L)    Triglyceride      <150 mg/dL   256 (H)    LDL-CHOLESTEROL      mg/dL (calc)   89    Cholesterol/HDL ratio      <5.0 (calc)   3.7    Non-HDL Cholesterol      <130 mg/dL (calc)   123    Creatinine Urine      20 - 275 mg/dL    247   Microalbumin, urine      mg/dL    2.0   Microalbumin/Creat Ratio      <30 mcg/mg creat    8   Hemoglobin A1c, (calculated)      <5.7 % of total Hgb 5.8 (H)      eAG (mg/dL)      mg/dL 120      eAG (mmol/L)      mmol/L 6.6          Assessment/Plan:     1.  DM w/o complication type II, controlled (250.00) her most recent Hgb A1c was 5.8% in 3/23 up from 5.6% in 9/22 down from 6.1% in 3/22 down from 7.3% in 2/21 stable from 8/20 up from 7.2% in 2/20 down from 7.4% in 5/19 up from 6.9% in 9/17 up from 5.8% in 9/16 down from 6.7% in 3/16 up from 6.3% in 9/15 down from 6.7% in 3/15 down from 6.8% in 9/14 up from 5.9% in 4/14 down 6.3% in 10/13 stable from June down from 6.4% in Feb 2013 up from 6.3% in October up from 6.1% in June down from 6.5% in March down from 6.7% in December 2011 down from 7% in September down from 7.2% in June down from 9.5% in March. Goal A1c is 7-7.5% so she is below goal but having diarrhea so decreased metformin. - cont Lantus 32 units at bedtime   - cont Novolog 10-16 units with meals plus 3 units for every 50 mg/dl above 150 mg/dl  - decrease Metformin 1000 mg to 1/2 tab in am and 1 tab in pm  - check bs 3 times per day  - foot exam done 3/23  - optho UTD spring 2014--due now  - microalbumin nl 3/23  - check A1c and cmp prior to next visit (send to [address] [facility])         2. Unspecified essential hypertension (401.9) her BP was at goal < 140/90   - cont toprol  mg at bedtime  - cont lisinopril 40 mg daily  - cont hctz 25 mg daily  - cont hydralazine 25 mg bid  - monitor home blood pressure readings       3. Mixed hyperlipidemia: Given DM and CAD, Goal LDL < 70, non-HDL < 100, and TG < 150. Non- and LDL 78 on zetia in Oct 2012, up to 137 and 92 in 6/13 due to weight gain and 145 and 90 in 10/13. Down to 140 and 88 in 4/14 and 131 and 74 in 9/14 and 135 and 100 in 3/15 and 85 in 9/15 and 64 in 3/16 and 84 in 9/16. Up to 115 in 9/17 and 128 in 5/19. LDL 98 and  in 2/20.  and  in 8/20. LDL 83 and  in 2/21. LDL 89 and  in 3/22 and LDL 93 and  in 9/22. LDL 89 and  in 3/23.    - cont zetia 10 mg daily  - check lipids prior to next visit       Patient Instructions   1) Decrease the metformin to 1/2 tab in the morning and 1 tab in the evening to see if this helps with diarrhea. 2) Change the duloxetine (cymbalta) to 30 mg caps to take 1 cap at bedtime for neuropathy and hopefully this won't cause as much sweating as 2 of the 20 mg caps.     3) Your cholesterol was slightly better but your triglycerides (short term fats) are still high. 4) Your creatinine (kidney test) was higher possibly from motrin and the diarrhea so we'll repeat next time. 5) Microalbumin/creatinine ratio is a marker of the amount of protein in your urine. Goal is less than 30. Your value is normal. This indicates that your kidneys are not being affected by your diabetes and/or blood pressure. Follow-up and Dispositions    Return in about 6 months (around 9/27/2023).                Copy sent to:  Dr. Cecile Evans Close Fax: [phone], Phone [phone]  Dr. Mariya Paredes (cardiology at [facility] in [place])

## 2023-04-23 DIAGNOSIS — Z79.4 CONTROLLED TYPE 2 DIABETES MELLITUS WITHOUT COMPLICATION, WITH LONG-TERM CURRENT USE OF INSULIN (HCC): Primary | ICD-10-CM

## 2023-04-23 DIAGNOSIS — E11.9 CONTROLLED TYPE 2 DIABETES MELLITUS WITHOUT COMPLICATION, WITH LONG-TERM CURRENT USE OF INSULIN (HCC): Primary | ICD-10-CM

## 2023-04-23 DIAGNOSIS — E78.2 MIXED HYPERLIPIDEMIA: ICD-10-CM

## 2023-04-23 DIAGNOSIS — I10 ESSENTIAL HYPERTENSION, BENIGN: ICD-10-CM

## 2023-04-24 DIAGNOSIS — Z79.4 CONTROLLED TYPE 2 DIABETES MELLITUS WITHOUT COMPLICATION, WITH LONG-TERM CURRENT USE OF INSULIN (HCC): Primary | ICD-10-CM

## 2023-04-24 DIAGNOSIS — E11.9 CONTROLLED TYPE 2 DIABETES MELLITUS WITHOUT COMPLICATION, WITH LONG-TERM CURRENT USE OF INSULIN (HCC): Primary | ICD-10-CM

## 2023-04-24 DIAGNOSIS — E78.2 MIXED HYPERLIPIDEMIA: ICD-10-CM

## 2023-04-24 DIAGNOSIS — I10 ESSENTIAL HYPERTENSION, BENIGN: ICD-10-CM

## 2023-05-04 RX ORDER — HYDROCHLOROTHIAZIDE 25 MG/1
25 TABLET ORAL DAILY
Qty: 90 TABLET | Refills: 3 | Status: SHIPPED | OUTPATIENT
Start: 2023-05-04

## 2023-05-10 RX ORDER — METOPROLOL SUCCINATE 200 MG/1
TABLET, EXTENDED RELEASE ORAL
Qty: 90 TABLET | Refills: 0 | Status: SHIPPED | OUTPATIENT
Start: 2023-05-10

## 2023-05-21 RX ORDER — TRAZODONE HYDROCHLORIDE 100 MG/1
100 TABLET ORAL NIGHTLY
COMMUNITY

## 2023-05-21 RX ORDER — INSULIN ASPART 100 [IU]/ML
INJECTION, SOLUTION INTRAVENOUS; SUBCUTANEOUS
COMMUNITY
Start: 2022-04-07 | End: 2023-06-22

## 2023-05-21 RX ORDER — INSULIN GLARGINE 100 [IU]/ML
INJECTION, SOLUTION SUBCUTANEOUS
COMMUNITY
Start: 2022-06-06 | End: 2023-07-23

## 2023-05-21 RX ORDER — GABAPENTIN 800 MG/1
TABLET ORAL 3 TIMES DAILY
COMMUNITY

## 2023-05-21 RX ORDER — HYDROCHLOROTHIAZIDE 25 MG/1
25 TABLET ORAL DAILY
COMMUNITY
Start: 2023-05-04

## 2023-05-21 RX ORDER — LISINOPRIL 40 MG/1
1 TABLET ORAL DAILY
COMMUNITY
Start: 2023-03-24

## 2023-05-21 RX ORDER — HYDRALAZINE HYDROCHLORIDE 25 MG/1
25 TABLET, FILM COATED ORAL 2 TIMES DAILY
COMMUNITY
Start: 2022-06-16 | End: 2023-06-26 | Stop reason: SDUPTHER

## 2023-05-21 RX ORDER — DULOXETIN HYDROCHLORIDE 30 MG/1
1 CAPSULE, DELAYED RELEASE ORAL NIGHTLY
COMMUNITY
Start: 2023-03-27

## 2023-05-21 RX ORDER — EZETIMIBE 10 MG/1
1 TABLET ORAL DAILY
COMMUNITY
Start: 2022-12-06

## 2023-05-21 RX ORDER — ASPIRIN 81 MG/1
TABLET ORAL DAILY
COMMUNITY

## 2023-06-22 RX ORDER — INSULIN ASPART 100 [IU]/ML
INJECTION, SOLUTION INTRAVENOUS; SUBCUTANEOUS
Qty: 30 ML | Refills: 11 | Status: SHIPPED | OUTPATIENT
Start: 2023-06-22

## 2023-06-26 RX ORDER — HYDRALAZINE HYDROCHLORIDE 25 MG/1
TABLET, FILM COATED ORAL
Qty: 180 TABLET | Refills: 3 | Status: SHIPPED | OUTPATIENT
Start: 2023-06-26

## 2023-07-23 RX ORDER — SYRINGE AND NEEDLE,INSULIN,1ML 31 GX5/16"
SYRINGE, EMPTY DISPOSABLE MISCELLANEOUS
Qty: 400 EACH | Refills: 2 | Status: SHIPPED | OUTPATIENT
Start: 2023-07-23

## 2023-07-23 RX ORDER — INSULIN GLARGINE 100 [IU]/ML
INJECTION, SOLUTION SUBCUTANEOUS
Qty: 20 ML | Refills: 10 | Status: SHIPPED | OUTPATIENT
Start: 2023-07-23

## 2023-08-03 NOTE — PROGRESS NOTES
Chief Complaint   Patient presents with    Diabetes     pcp and pharmacy confirmed    Other     a1c done     History of Present Illness: Mayco Gomes is a 70 y.o. female here for follow up of diabetes. Since last visit in 2/21, she underwent CABG 2v on 8/26/22 at [facility] and her med list was updated to the ones below. Has been doing lantus 36 units at night and novolog 10-16 units before meals. She is having lows under 80 2-3 times a week in the morning. Does not tend to have many lows during the day after meals and most sugars are 100-150 during the day. Weight was down to 183 lbs but is up about 9 lbs since the surgery. Has been getting higher BP readings in the 150-160s/80s at home. Did see a reading of 200/103 last week and it came down over the course of the day. Has been under more back pain recently. Takes both toprol XL and lisinopril in the morning. No longer on hctz nor Imdur after her hospital stay. Still taking zetia. Current Outpatient Medications   Medication Sig    lisinopriL (PRINIVIL, ZESTRIL) 10 mg tablet Take  by mouth daily.  metoprolol succinate (TOPROL-XL) 100 mg tablet Take 100 mg by mouth daily.     insulin glargine (Lantus U-100 Insulin) 100 unit/mL injection INJECT 36 UNITS SUBCUTANEOUSLY NIGHTLY OR AS DIRECTED **(UP TO 60 UNITS PER DAY)--Dose change 03/01/22--updated med list--did not send prescription to the pharmacy    Zetia 10 mg tablet TAKE 1 TABLET BY MOUTH EVERY DAY    metFORMIN (GLUCOPHAGE) 1,000 mg tablet TAKE 1 TABLET BY MOUTH TWICE DAILY WITH FOOD    Insulin Syringe-Needle U-100 1 mL 31 gauge x 5/16 syrg USE AS DIRECTED TO INJECT INSULIN 4 TIMES DAILY    insulin aspart U-100 (NovoLOG U-100 Insulin aspart) 100 unit/mL injection INJECT 10-20 UNITS SUBCUTANEOUSLY BEFORE MEALS    DULoxetine (CYMBALTA) 20 mg capsule Take 2 caps daily--delete the 60 mg dose from profile    FreeStyle Lite Strips strip USE TO TEST SUGAR THREE TIMES DAILY    b complex vitamins Endoleak, abdominal pain endoleak, abdominal pain endoleak, abdominal pain endoleak, abdominal pain endoleak, abdominal pain endoleak, abdominal pain endoleak, abdominal pain endoleak, abdominal pain endoleak, abdominal pain endoleak, abdominal pain endoleak, abdominal pain endoleak, abdominal pain endoleak, abdominal pain endoleak, abdominal pain endoleak, abdominal pain endoleak, abdominal pain endoleak, abdominal pain endoleak, abdominal pain endoleak, abdominal pain endoleak, abdominal pain endoleak, abdominal pain endoleak, abdominal pain endoleak, abdominal pain endoleak, abdominal pain endoleak, abdominal pain endoleak, abdominal pain endoleak, abdominal pain endoleak, abdominal pain endoleak, abdominal pain endoleak, abdominal pain endoleak, abdominal pain endoleak, abdominal pain endoleak, abdominal pain endoleak, abdominal pain endoleak, abdominal pain endoleak, abdominal pain endoleak, abdominal pain endoleak, abdominal pain endoleak, abdominal pain endoleak, abdominal pain endoleak, abdominal pain endoleak, abdominal pain endoleak, abdominal pain endoleak, abdominal pain endoleak, abdominal pain endoleak, abdominal pain endoleak, abdominal pain endoleak, abdominal pain endoleak, abdominal pain endoleak, abdominal pain endoleak, abdominal pain endoleak, abdominal pain endoleak, abdominal pain endoleak, abdominal pain endoleak, abdominal pain endoleak, abdominal pain endoleak, abdominal pain endoleak, abdominal pain endoleak, abdominal pain endoleak, abdominal pain endoleak, abdominal pain endoleak, abdominal pain endoleak, abdominal pain endoleak, abdominal pain endoleak, abdominal pain endoleak, abdominal pain endoleak, abdominal pain endoleak, abdominal pain endoleak, abdominal pain endoleak, abdominal pain endoleak, abdominal pain endoleak, abdominal pain endoleak, abdominal pain endoleak, abdominal pain endoleak, abdominal pain endoleak, abdominal pain endoleak, abdominal pain endoleak, abdominal pain endoleak, abdominal pain endoleak, abdominal pain endoleak, abdominal pain endoleak, abdominal pain endoleak, abdominal pain endoleak, abdominal pain endoleak, abdominal pain endoleak, abdominal pain endoleak, abdominal pain endoleak, abdominal pain endoleak, abdominal pain endoleak, abdominal pain endoleak, abdominal pain endoleak, abdominal pain endoleak, abdominal pain endoleak, abdominal pain endoleak, abdominal pain endoleak, abdominal pain endoleak, abdominal pain endoleak, abdominal pain endoleak, abdominal pain endoleak, abdominal pain endoleak, abdominal pain endoleak, abdominal pain endoleak, abdominal pain endoleak, abdominal pain endoleak, abdominal pain endoleak, abdominal pain endoleak, abdominal pain endoleak, abdominal pain endoleak, abdominal pain endoleak, abdominal pain endoleak, abdominal pain endoleak, abdominal pain endoleak, abdominal pain endoleak, abdominal pain endoleak, abdominal pain endoleak, abdominal pain endoleak, abdominal pain endoleak, abdominal pain endoleak, abdominal pain endoleak, abdominal pain endoleak, abdominal pain endoleak, abdominal pain endoleak, abdominal pain endoleak, abdominal pain endoleak, abdominal pain endoleak, abdominal pain endoleak, abdominal pain endoleak, abdominal pain endoleak, abdominal pain endoleak, abdominal pain endoleak, abdominal pain endoleak, abdominal pain endoleak, abdominal pain endoleak, abdominal pain endoleak, abdominal pain endoleak, abdominal pain endoleak, abdominal pain endoleak, abdominal pain endoleak, abdominal pain endoleak, abdominal pain endoleak, abdominal pain endoleak, abdominal pain endoleak, abdominal pain endoleak, abdominal pain endoleak, abdominal pain endoleak, abdominal pain endoleak, abdominal pain endoleak, abdominal pain endoleak, abdominal pain endoleak, abdominal pain endoleak, abdominal pain endoleak, abdominal pain endoleak, abdominal pain endoleak, abdominal pain endoleak, abdominal pain endoleak, abdominal pain endoleak, abdominal pain endoleak, abdominal pain endoleak, abdominal pain endoleak, abdominal pain endoleak, abdominal pain endoleak, abdominal pain endoleak, abdominal pain endoleak, abdominal pain endoleak, abdominal pain endoleak, abdominal pain endoleak, abdominal pain endoleak, abdominal pain endoleak, abdominal pain endoleak, abdominal pain endoleak, abdominal pain endoleak, abdominal pain endoleak, abdominal pain endoleak, abdominal pain endoleak, abdominal pain endoleak, abdominal pain tablet Take 1 Tab by mouth daily.  gabapentin (NEURONTIN) 800 mg tablet Take  by mouth three (3) times daily.  ULTRA COMFORT INSULIN SYRINGE 1 mL 31 x 5/16\" syrg USE AS DIRECTED TO INJECT INSULIN DAILY    Insulin Needles, Disposable, (BD INSULIN PEN NEEDLE UF SHORT) 31 X 5/16 \" Ndle Use as directed up to 4 times daily    CALCIUM CARBONATE/VITAMIN D3 (CALCIUM + D PO) Take  by mouth daily.  trazodone (DESYREL) 100 mg tablet Take 100 mg by mouth nightly.  UBIDECARENONE/VITAMIN E MIXED (COQ10  PO) Take  by mouth daily.  aspirin delayed-release 81 mg tablet Take  by mouth daily.  ERGOCALCIFEROL, VITAMIN D2, (VITAMIN D PO) Take 8,000 Units by mouth daily. No current facility-administered medications for this visit. Allergies   Allergen Reactions    Adhesive Tape-Silicones Rash    Compazine [Prochlorperazine Edisylate] Other (comments)    Gemfibrozil Other (comments)     Nausea, liver pain, bone pain    Inova [Benzoyl Peroxide] Other (comments)    Iodine Shortness of Breath, Rash and Swelling    Oxycontin [Oxycodone] Rash    Statins-Hmg-Coa Reductase Inhibitors Myalgia     With lipitor, crestor, and pravastatin    Wellbutrin [Bupropion Hcl] Other (comments)     Had homicidal thoughts     Review of Systems: PER HPI    Physical Examination:  Blood pressure (!) 191/95, pulse 62, height 5' 2\" (1.575 m), weight 192 lb 6.4 oz (87.3 kg).   - General: pleasant, no distress, good eye contact   - Neck: no carotid bruits  - Cardiovascular: regular, normal rate, nl s1 and s2, no m/r/g,   - Respiratory: clear bilaterally  - Integumentary: no edema,   - Psychiatric: normal mood and affect    Diabetic foot exam:     Left Foot:   Visual Exam: callous - mild   Pulse DP: 2+ (normal)   Filament test: absent sensation    Vibratory sensation: absent      Right Foot:   Visual Exam: callous - mild   Pulse DP: 2+ (normal)   Filament test: absent sensation    Vibratory sensation: absent        Data Reviewed:   Component      Latest Ref Rng & Units 3/1/2022           4:35 PM   Hemoglobin A1c (POC)      % 6.1       Assessment/Plan:     1. DM w/o complication type II, controlled (250.00) her most recent Hgb A1c was 6.1% in 3/22 down from 7.3% in 2/21 stable from 8/20 up from 7.2% in 2/20 down from 7.4% in 5/19 up from 6.9% in 9/17 up from 5.8% in 9/16 down from 6.7% in 3/16 up from 6.3% in 9/15 down from 6.7% in 3/15 down from 6.8% in 9/14 up from 5.9% in 4/14 down 6.3% in 10/13 stable from June down from 6.4% in Feb 2013 up from 6.3% in October up from 6.1% in June down from 6.5% in March down from 6.7% in December 2011 down from 7% in September down from 7.2% in June down from 9.5% in March. Goal A1c is 7-7.5% so she is below goal likely due to fasting hypoglycemia so decreased her lantus. - decrease Lantus to 32 units at bedtime   - cont Novolog 10-16 units with meals plus 3 units for every 50 mg/dl above 150 mg/dl  - cont Metformin 1000 mg bid  - check bs 3 times per day  - foot exam done 2/22  - optho UTD spring 2014--due now  - microalbumin nl 8/20  - check cmp and microalbumin now (send to [facility])  - check A1c and cmp prior to next visit       2. Unspecified essential hypertension (401.9) her BP was above goal < 140/90 so increased her lisinopril and moved her toprol XL to bedtime  - move toprol  mg to bedtime  - increase lisinopril to 20 mg daily  - monitor home blood pressure readings       3. Mixed hyperlipidemia: Given DM and CAD, Goal LDL < 70, non-HDL < 100, and TG < 150. Non- and LDL 78 on zetia in Oct 2012, up to 137 and 92 in 6/13 due to weight gain and 145 and 90 in 10/13. Down to 140 and 88 in 4/14 and 131 and 74 in 9/14 and 135 and 100 in 3/15 and 85 in 9/15 and 64 in 3/16 and 84 in 9/16. Up to 115 in 9/17 and 128 in 5/19. LDL 98 and  in 2/20.  and  in 8/20. LDL 83 and  in 2/21. May consider PCSK9 inhibitor in the future.   - cont zetia 10 mg daily  - endoleak, abdominal pain endoleak, abdominal pain endoleak, abdominal pain endoleak, abdominal pain endoleak, abdominal pain endoleak, abdominal pain endoleak, abdominal pain endoleak, abdominal pain endoleak, abdominal pain endoleak, abdominal pain endoleak, abdominal pain endoleak, abdominal pain endoleak, abdominal pain endoleak, abdominal pain endoleak, abdominal pain endoleak, abdominal pain endoleak, abdominal pain endoleak, abdominal pain endoleak, abdominal pain endoleak, abdominal pain endoleak, abdominal pain endoleak, abdominal pain endoleak, abdominal pain endoleak, abdominal pain endoleak, abdominal pain endoleak, abdominal pain endoleak, abdominal pain endoleak, abdominal pain endoleak, abdominal pain endoleak, abdominal pain endoleak, abdominal pain endoleak, abdominal pain endoleak, abdominal pain endoleak, abdominal pain endoleak, abdominal pain endoleak, abdominal pain endoleak, abdominal pain endoleak, abdominal pain endoleak, abdominal pain endoleak, abdominal pain endoleak, abdominal pain endoleak, abdominal pain endoleak, abdominal pain endoleak, abdominal pain endoleak, abdominal pain endoleak, abdominal pain endoleak, abdominal pain endoleak, abdominal pain endoleak, abdominal pain endoleak, abdominal pain endoleak, abdominal pain endoleak, abdominal pain endoleak, abdominal pain endoleak, abdominal pain endoleak, abdominal pain endoleak, abdominal pain endoleak, abdominal pain check lipids now and prior to next visit       Patient Instructions   1) Decrease the lantus to 32 units at bedtime and see if this keeps your fasting sugars between . If you still have 2 or more readings under 90 in a week, then decrease your lantus by 2 units every week to keep your sugars over 90 in the morning. 2) Your A1c is very good at 6.1% down from 7.3% in 2/21 but I'm concerned about the frequency of lows and would prefer your A1c to be closer to 7-7.5%. Stay on your 10-16 units of novolog but if you are getting lows 2-3 hours after eating, then you need less novolog for the meals. 3) Stay on the metoprolol 100 mg daily but move your pill from morning to bedtime starting tomorrow night. Tonight, plan on taking one more dose of 10 mg of lisinopril and then starting tomorrow morning, take 2 of the 10 mg tabs together every morning to use up your supply. I sent 20 mg lisinopril tabs to the pharmacy to take 1 tab in the morning when you use up the 10 mg tabs. 4) Start monitoring blood pressure about 2-3 times per week at alternating times either in the morning or evening after resting for 5 minutes and sitting upright in a chair with your arm at heart level. Please let me know if you are having readings over 140 on the top number or 90 on the bottom number after 2 weeks on this new regimen. 5)  I will send you a message through [employer] with your lab results. Follow-up and Dispositions    · Return in about 6 months (around 9/1/2022).                Copy sent to:  Dr. Allen Soriano Fax: [phone], Phone [phone]  Dr. Bryan Bowden (cardiology at [facility] in [place])    Lab follow up: 03/05/22    Component      Latest Ref Rng & Units 3/4/2022 3/4/2022 3/4/2022          11:35 AM 11:35 AM 11:35 AM   Glucose      65 - 99 mg/dL  147 (H)    BUN      7 - 25 mg/dL  20    Creatinine      0.60 - 0.93 mg/dL  1.14 (H)    GFR est non-AA      > OR = 60 endoleak, abdominal pain endoleak, abdominal pain endoleak, abdominal pain endoleak, abdominal pain endoleak, abdominal pain endoleak, abdominal pain endoleak, abdominal pain endoleak, abdominal pain endoleak, abdominal pain endoleak, abdominal pain endoleak, abdominal pain endoleak, abdominal pain endoleak, abdominal pain endoleak, abdominal pain endoleak, abdominal pain endoleak, abdominal pain endoleak, abdominal pain endoleak, abdominal pain endoleak, abdominal pain endoleak, abdominal pain endoleak, abdominal pain endoleak, abdominal pain endoleak, abdominal pain endoleak, abdominal pain endoleak, abdominal pain endoleak, abdominal pain endoleak, abdominal pain endoleak, abdominal pain endoleak, abdominal pain endoleak, abdominal pain endoleak, abdominal pain endoleak, abdominal pain endoleak, abdominal pain endoleak, abdominal pain endoleak, abdominal pain endoleak, abdominal pain endoleak, abdominal pain endoleak, abdominal pain endoleak, abdominal pain endoleak, abdominal pain endoleak, abdominal pain endoleak, abdominal pain endoleak, abdominal pain endoleak, abdominal pain endoleak, abdominal pain mL/min/1.73m2  48 (L)    GFR est AA      > OR = 60 mL/min/1.73m2  56 (L)    BUN/Creatinine ratio      6 - 22 (calc)  18    Sodium      135 - 146 mmol/L  138    Potassium      3.5 - 5.3 mmol/L  4.8    Chloride      98 - 110 mmol/L  101    CO2      20 - 32 mmol/L  30    Calcium      8.6 - 10.4 mg/dL  10.1    Protein, total      6.1 - 8.1 g/dL  6.5    Albumin      3.6 - 5.1 g/dL  4.3    Globulin      1.9 - 3.7 g/dL (calc)  2.2    ALB/GLOBRATIO      1.0 - 2.5 (calc)  2.0    Bilirubin, total      0.2 - 1.2 mg/dL  0.5    Alkaline Phosphatase, total      37 - 153 U/L  94    AST      10 - 35 U/L  20    ALT      6 - 29 U/L  30 (H)    Cholesterol, total      <200 mg/dL   176   HDL Cholesterol      > OR = 50 mg/dL   59   Triglyceride      <150 mg/dL   181 (H)   LDL-CHOLESTEROL      mg/dL (calc)   89   Cholesterol/HDL ratio      <5.0 (calc)   3.0   Non-HDL Cholesterol      <130 mg/dL (calc)   117   Creatinine Urine      20 - 275 mg/dL 180     Microalbumin, urine      mg/dL 2.3     Microalbumin/Creat Ratio      <30 mcg/mg creat 13       Sent her the following message through [employer]:  -------------------------------------------------------------------------------------------------------------------  Total Cholesterol is the total number of cholesterol particles in your blood. Goal is less than 200. Triglycerides are the short term fats in your blood. Goal is less than 150. HDL is the good cholesterol in your blood. Goal is more than 50 if you are a woman and 40 if you are a man. LDL is the bad cholesterol in your blood. Goal is less than 100 unless you have a history of heart disease or stroke and then goal is under 70. Your triglycerides are lower and your HDL is higher than last year. Continue to follow a low cholesterol diet.   Try to limit the amount of fried foods, fatty foods, butter, gravy, red meat, ice cream, cheese, and eggs in your diet, which are all high in cholesterol.  -------------------------------------------------------------------------------------------------------------------  BUN and creatinine are markers of kidney function. Your creatinine (kidney test) was slightly high due to mild dehydration. Drink at least 4 8oz glasses of water everyday to stay hydrated to prevent your creatinine level from going higher. -------------------------------------------------------------------------------------------------------------------  ALT and AST are markers of liver function. Your ALT is likely elevated due to fatty deposition in the liver that can occur with weight gain. Hopefully with weight loss, this value will return to normal.  -------------------------------------------------------------------------------------------------------------------  Microalbumin/creatinine ratio is a marker of the amount of protein in your urine. Goal is less than 30. Your value is normal. This indicates that your kidneys are not being affected by your diabetes and/or blood pressure. endoleak, abdominal pain endoleak, abdominal pain endoleak, abdominal pain endoleak, abdominal pain endoleak, abdominal pain endoleak, abdominal pain endoleak, abdominal pain

## 2023-09-16 DIAGNOSIS — Z79.4 CONTROLLED TYPE 2 DIABETES MELLITUS WITHOUT COMPLICATION, WITH LONG-TERM CURRENT USE OF INSULIN (HCC): ICD-10-CM

## 2023-09-16 DIAGNOSIS — I10 ESSENTIAL HYPERTENSION, BENIGN: ICD-10-CM

## 2023-09-16 DIAGNOSIS — E11.9 CONTROLLED TYPE 2 DIABETES MELLITUS WITHOUT COMPLICATION, WITH LONG-TERM CURRENT USE OF INSULIN (HCC): ICD-10-CM

## 2023-09-16 DIAGNOSIS — E78.2 MIXED HYPERLIPIDEMIA: ICD-10-CM

## 2023-09-19 ENCOUNTER — TELEPHONE (OUTPATIENT)
Age: 73
End: 2023-09-19

## 2023-09-19 DIAGNOSIS — E11.9 TYPE 2 DIABETES MELLITUS WITHOUT COMPLICATION, WITH LONG-TERM CURRENT USE OF INSULIN (HCC): Primary | ICD-10-CM

## 2023-09-19 DIAGNOSIS — Z79.4 TYPE 2 DIABETES MELLITUS WITHOUT COMPLICATION, WITH LONG-TERM CURRENT USE OF INSULIN (HCC): Primary | ICD-10-CM

## 2023-09-19 RX ORDER — INSULIN DEGLUDEC 100 U/ML
60 INJECTION, SOLUTION SUBCUTANEOUS DAILY
Qty: 20 ML | Refills: 10 | Status: SHIPPED | OUTPATIENT
Start: 2023-09-19

## 2023-09-19 NOTE — TELEPHONE ENCOUNTER
Received a fax from [facility] stating Lantus is no longer covered by the pt's insurance. Covered alternatives are Basaglar, Levemir, and [employer] [place].

## 2023-09-19 NOTE — TELEPHONE ENCOUNTER
Please let her know that her pharmacy contacted us that lantus is no longer covered but a sister insulin called tresiba is and my partner, Dr. Luis Hayward, sent in a new prescription for vials of this insulin to [facility]. She should still take 32 units at bedtime and if this insulin is too strong and causes any lows under 90 in the morning, then she should decrease to 30 units.

## 2023-09-20 NOTE — TELEPHONE ENCOUNTER
Detailed message left on pt's voicemail stating her full name. Callback number left if pt needs to speak with nurse.

## 2023-09-28 LAB
ALB/GLOBRATIO, 58C: 1.8 (CALC) (ref 1–2.5)
ALBUMIN SERPL-MCNC: 4.1 G/DL (ref 3.6–5.1)
ALKALINE PHOSPHATASE, TOTAL, 25002000: 87 U/L (ref 37–153)
ALT SERPL-CCNC: 18 U/L (ref 6–29)
AST SERPL W P-5'-P-CCNC: 17 U/L (ref 10–35)
BILIRUB SERPL-MCNC: 0.5 MG/DL (ref 0.2–1.2)
BUN SERPL-MCNC: 29 MG/DL (ref 7–25)
BUN/CREATININE RATIO,BUCR: 20 (CALC) (ref 6–22)
CALCIUM SERPL-MCNC: 9.9 MG/DL (ref 8.6–10.4)
CHLORIDE SERPL-SCNC: 101 MMOL/L (ref 98–110)
CHOL/HDL RATIO,CHHDX: 3.9 (CALC)
CHOLEST SERPL-MCNC: 186 MG/DL
CO2 SERPL-SCNC: 30 MMOL/L (ref 20–32)
CREAT SERPL-MCNC: 1.42 MG/DL (ref 0.6–1)
EAG (MG/DL),9916804: 117 MG/DL
EAG (MMOL/L),9916805: 6.5 MMOL/L
EGFR: 39 ML/MIN/1.73M2
GLOBULIN,GLOB: 2.3 G/DL (CALC) (ref 1.9–3.7)
GLUCOSE SERPL-MCNC: 101 MG/DL (ref 65–99)
HBA1C MFR BLD HPLC: 5.7 %
HBA1C MFR BLD HPLC: 5.7 % OF TOTAL HGB
HDLC SERPL-MCNC: 48 MG/DL
LDL-CHOLESTEROL: 102 MG/DL (CALC)
NON-HDL CHOLESTEROL, 011976: 138 MG/DL (CALC)
POTASSIUM SERPL-SCNC: 5.1 MMOL/L (ref 3.5–5.3)
PROT SERPL-MCNC: 6.4 G/DL (ref 6.1–8.1)
SODIUM SERPL-SCNC: 138 MMOL/L (ref 135–146)
TRIGL SERPL-MCNC: 254 MG/DL (ref ?–150)

## 2023-10-02 ENCOUNTER — TELEPHONE (OUTPATIENT)
Age: 73
End: 2023-10-02

## 2023-10-02 ENCOUNTER — TELEMEDICINE (OUTPATIENT)
Age: 73
End: 2023-10-02
Payer: MEDICARE

## 2023-10-02 DIAGNOSIS — E78.2 MIXED HYPERLIPIDEMIA: ICD-10-CM

## 2023-10-02 DIAGNOSIS — I10 ESSENTIAL (PRIMARY) HYPERTENSION: ICD-10-CM

## 2023-10-02 DIAGNOSIS — Z79.4 TYPE 2 DIABETES MELLITUS WITHOUT COMPLICATION, WITH LONG-TERM CURRENT USE OF INSULIN (HCC): Primary | ICD-10-CM

## 2023-10-02 DIAGNOSIS — E11.9 TYPE 2 DIABETES MELLITUS WITHOUT COMPLICATION, WITH LONG-TERM CURRENT USE OF INSULIN (HCC): Primary | ICD-10-CM

## 2023-10-02 PROCEDURE — 99442 PR PHYS/QHP TELEPHONE EVALUATION 11-20 MIN: CPT | Performed by: INTERNAL MEDICINE

## 2023-10-02 NOTE — TELEPHONE ENCOUNTER
It appears she has already been changed to virtual and you can let her know it's fine to do a phone visit but my preference is video based but if she doesn't have video then we can still do this by phone. Thanks.

## 2023-10-02 NOTE — TELEPHONE ENCOUNTER
Pt JUNIORM stating she doesn't feel well and doesn't want to drive 2 hrs to her appt today at 2:00 PM. She asked can she do a phone visit.

## 2023-10-02 NOTE — PROGRESS NOTES
18 minutes on the day of this virtual visit with a telephone encounter. All questions were answered and a copy of the instructions were sent to her via [employer].       Copy sent to:  Dr. Darío Kendrick Close Fax: [phone], Phone [phone]  Dr. Bessie Alonso (cardiology at [facility] in [place])

## 2023-10-02 NOTE — PATIENT INSTRUCTIONS
1) Your Hemoglobin A1c (3 month test of blood sugar) was very good at 5.7% down from 5.8%. 2) BUN and creatinine are markers of kidney function. Your creatinine (kidney test) was slightly higher due to mild dehydration. Drink at least 4 8oz glasses of water everyday to stay hydrated to prevent your creatinine level from going higher. 3) ALT and AST are markers of liver function. Your values are normal.    4) Your cholesterol was slightly higher than last time. 5) Please go to your local [facility] 3-4 days before your next appointment to have your labs drawn. I will mail you a lab slip. 6) Please come for a follow up visit on 4/9/24 at 1:30pm in our [place] office.

## 2023-11-16 ENCOUNTER — TELEPHONE (OUTPATIENT)
Age: 73
End: 2023-11-16

## 2023-11-16 NOTE — TELEPHONE ENCOUNTER
[address] [place] [address] and spoke to [employer] I informed [employer] that we received a notification of the pt's Lantus vials needing a PA. I asked [employer] if there were any alternatives listed. [employer] informed me that the pt has 2 insurances and whoever ran the Rx the first time must've not ran in through her secondary insurance ([employer]).  [employer] stated [employer] paid the claim for the Rx and to disregard the PA request.

## 2023-11-29 RX ORDER — EZETIMIBE 10 MG
10 TABLET ORAL DAILY
Qty: 90 TABLET | Refills: 3 | Status: SHIPPED | OUTPATIENT
Start: 2023-11-29

## 2024-02-26 RX ORDER — INSULIN GLARGINE 100 [IU]/ML
INJECTION, SOLUTION SUBCUTANEOUS
Qty: 20 ML | Refills: 10
Start: 2024-02-26

## 2024-02-26 RX ORDER — GLUCOSAMINE HCL/CHONDROITIN SU 500-400 MG
CAPSULE ORAL
Qty: 300 STRIP | Refills: 3 | Status: SHIPPED | OUTPATIENT
Start: 2024-02-26

## 2024-03-07 RX ORDER — DULOXETIN HYDROCHLORIDE 30 MG/1
CAPSULE, DELAYED RELEASE ORAL NIGHTLY
Qty: 90 CAPSULE | Refills: 3 | Status: SHIPPED | OUTPATIENT
Start: 2024-03-07

## 2024-04-09 ENCOUNTER — TELEMEDICINE (OUTPATIENT)
Age: 74
End: 2024-04-09
Payer: MEDICARE

## 2024-04-09 DIAGNOSIS — E11.9 TYPE 2 DIABETES MELLITUS WITHOUT COMPLICATION, WITH LONG-TERM CURRENT USE OF INSULIN (HCC): Primary | ICD-10-CM

## 2024-04-09 DIAGNOSIS — I10 ESSENTIAL (PRIMARY) HYPERTENSION: ICD-10-CM

## 2024-04-09 DIAGNOSIS — E78.2 MIXED HYPERLIPIDEMIA: ICD-10-CM

## 2024-04-09 DIAGNOSIS — Z79.4 TYPE 2 DIABETES MELLITUS WITHOUT COMPLICATION, WITH LONG-TERM CURRENT USE OF INSULIN (HCC): Primary | ICD-10-CM

## 2024-04-09 PROCEDURE — 99443 PR PHYS/QHP TELEPHONE EVALUATION 21-30 MIN: CPT | Performed by: INTERNAL MEDICINE

## 2024-04-09 RX ORDER — ACYCLOVIR 400 MG/1
TABLET ORAL
Qty: 9 EACH | Refills: 3 | Status: SHIPPED | OUTPATIENT
Start: 2024-04-09

## 2024-04-09 RX ORDER — INSULIN ASPART 100 [IU]/ML
INJECTION, SOLUTION INTRAVENOUS; SUBCUTANEOUS
Qty: 30 ML | Refills: 11
Start: 2024-04-09

## 2024-04-09 RX ORDER — LORAZEPAM 0.5 MG/1
0.5 TABLET ORAL NIGHTLY
COMMUNITY
Start: 2024-03-25

## 2024-04-09 RX ORDER — INSULIN GLARGINE 100 [IU]/ML
INJECTION, SOLUTION SUBCUTANEOUS
Qty: 20 ML | Refills: 10
Start: 2024-04-09

## 2024-04-09 RX ORDER — ACYCLOVIR 400 MG/1
TABLET ORAL
Qty: 1 EACH | Refills: 0 | Status: SHIPPED | OUTPATIENT
Start: 2024-04-09

## 2024-04-09 RX ORDER — LISINOPRIL 40 MG/1
TABLET ORAL DAILY
Qty: 90 TABLET | Refills: 3 | Status: SHIPPED | OUTPATIENT
Start: 2024-04-09

## 2024-04-09 NOTE — PATIENT INSTRUCTIONS
1) I sent your prescription for Dexcom G7 sensors and a  to [facility] in [place] (phone: [phone]) who will process your order and reach out to me for paperwork to get you approved and the supplies will be sent to your house.  You can go on the Dexcom website to watch their videos about setting this up but if you have any questions and feel you need more training then let me know and I can put in a referral our diabetes education team.     2) Please decrease your lantus to 26 units at bedtime.    3) Dose your novolog based on the scale below:  Blood sugar  Insulin dose          Less than 90  Eat first, 8 units       10 units    151-200  12 units      201-250  14 units     251-300  16 units      301-350  18 units      351-400  20 units  401-450  22 units  451-500  24 units  Over 500  26 units    4) Please come for a follow up visit on 10/4/24 at 1:50pm in our [place] office.    5) Take the enclosed lab slip to repeat your labs at [facility].   I will send you a message through [employer] with your lab results.

## 2024-04-09 NOTE — PROGRESS NOTES
Chief Complaint   Patient presents with    Diabetes       [phone] phone call per pt due to bad connection in her area        Other     PCP and pharmacy confirmed       **THIS IS A VIRTUAL VISIT VIA A TELEPHONE ENCOUNTER.  PATIENT AGREED TO HAVE THEIR CARE DELIVERED OVER THE PHONE IN PLACE OF THEIR REGULARLY SCHEDULED OFFICE VISIT**    History of Present Illness: Marni Dale is a 73 y.o. female here for follow up of diabetes.  She had to do a phone visit today as she just didn't feel well enough to drive 2 hours to come and see me.  She has not been eating as much over the past year and her weight is currently 178 lbs down from 194 lbs at last in person visit in 3/23.  She reached out to me about low blood sugars in 2/24 and even had an ER visit for hypoglycemia with an overnight value down to 58.  I decreased her lantus to 28 units at bedtime and her novolog to 12 units before meals but despite this is still having some lows overnight and after meals so we agreed to further decrease both doses.  She would like to start Dexcom to gain better control and help with hypoglycemia.    she has the following indications to begin treatment with Dexcom:  1) she has type 2 diabetes (E11.9) and is on an intensive insulin regimen with 4 injections per day  2) she tests her blood sugar 3 times per day and makes treatment decisions off her blood sugar readings and will do the same off dexcom sensor readings  3) she will require frequent adjustments to her insulin injection doses based on her dexcom sensor readings  4) she will benefit from therapeutic continuous glucose monitoring and I recommend that she begin this  5) she is seen in my office every 6 months      Current Outpatient Medications   Medication Sig    lisinopril (PRINIVIL;ZESTRIL) 40 MG tablet TAKE 1 TABLET BY MOUTH DAILY    ATIVAN 0.5 MG tablet Take 1 tablet by mouth at bedtime.    DULoxetine (CYMBALTA) 30 MG extended release capsule TAKE 1 CAPSULE BY MOUTH

## 2024-05-22 RX ORDER — HYDROCHLOROTHIAZIDE 25 MG/1
25 TABLET ORAL DAILY
Qty: 90 TABLET | Refills: 3 | Status: SHIPPED | OUTPATIENT
Start: 2024-05-22

## 2024-07-16 RX ORDER — INSULIN GLARGINE 100 [IU]/ML
INJECTION, SOLUTION SUBCUTANEOUS
Qty: 20 ML | Refills: 10
Start: 2024-07-16

## 2024-07-16 RX ORDER — INSULIN ASPART 100 [IU]/ML
INJECTION, SOLUTION INTRAVENOUS; SUBCUTANEOUS
Qty: 30 ML | Refills: 11
Start: 2024-07-16

## 2024-08-05 RX ORDER — HYDRALAZINE HYDROCHLORIDE 25 MG/1
TABLET, FILM COATED ORAL
Qty: 180 TABLET | Refills: 3 | Status: SHIPPED | OUTPATIENT
Start: 2024-08-05

## 2024-08-16 RX ORDER — INSULIN GLARGINE 100 [IU]/ML
INJECTION, SOLUTION SUBCUTANEOUS
Qty: 30 ML | Refills: 3 | Status: SHIPPED | OUTPATIENT
Start: 2024-08-16

## 2024-08-16 NOTE — TELEPHONE ENCOUNTER
Requested Prescriptions     Pending Prescriptions Disp Refills    insulin glargine (LANTUS) 100 UNIT/ML injection vial [Pharmacy Med Name: LANTUS 100 UNIT/ML INJECTION] 20 mL 9     Sig: INJECT 32 UNITS INTO THE SKIN NIGHTLY OR AS DIRECTED **(UP TO 60 UNITS PER DAY)      Message routed to   LOV: 10/02/2023 (VV)  Next appt: 10/04/

## 2024-10-01 RX ORDER — INSULIN ASPART 100 [IU]/ML
INJECTION, SOLUTION INTRAVENOUS; SUBCUTANEOUS
Qty: 30 ML | Refills: 11 | Status: SHIPPED | OUTPATIENT
Start: 2024-10-01 | End: 2024-10-04 | Stop reason: DRUGHIGH

## 2024-10-04 ENCOUNTER — OFFICE VISIT (OUTPATIENT)
Age: 74
End: 2024-10-04

## 2024-10-04 VITALS
WEIGHT: 171.2 LBS | SYSTOLIC BLOOD PRESSURE: 134 MMHG | HEART RATE: 53 BPM | BODY MASS INDEX: 31.5 KG/M2 | HEIGHT: 62 IN | DIASTOLIC BLOOD PRESSURE: 63 MMHG

## 2024-10-04 DIAGNOSIS — E11.9 TYPE 2 DIABETES MELLITUS WITHOUT COMPLICATION, WITH LONG-TERM CURRENT USE OF INSULIN (HCC): Primary | ICD-10-CM

## 2024-10-04 DIAGNOSIS — E78.2 MIXED HYPERLIPIDEMIA: ICD-10-CM

## 2024-10-04 DIAGNOSIS — Z79.4 TYPE 2 DIABETES MELLITUS WITHOUT COMPLICATION, WITH LONG-TERM CURRENT USE OF INSULIN (HCC): Primary | ICD-10-CM

## 2024-10-04 DIAGNOSIS — I10 ESSENTIAL (PRIMARY) HYPERTENSION: ICD-10-CM

## 2024-10-04 RX ORDER — GABAPENTIN 600 MG/1
TABLET ORAL 4 TIMES DAILY
COMMUNITY
Start: 2024-09-10

## 2024-10-04 RX ORDER — INSULIN GLARGINE 100 [IU]/ML
INJECTION, SOLUTION SUBCUTANEOUS
Qty: 30 ML | Refills: 3 | Status: SHIPPED | OUTPATIENT
Start: 2024-10-04 | End: 2024-10-04 | Stop reason: DRUGHIGH

## 2024-10-04 RX ORDER — INSULIN ASPART 100 [IU]/ML
INJECTION, SOLUTION INTRAVENOUS; SUBCUTANEOUS
Qty: 30 ML | Refills: 11 | Status: SHIPPED | OUTPATIENT
Start: 2024-10-04

## 2024-10-04 RX ORDER — INSULIN GLARGINE 100 [IU]/ML
INJECTION, SOLUTION SUBCUTANEOUS
Qty: 30 ML | Refills: 3 | Status: SHIPPED | OUTPATIENT
Start: 2024-10-04

## 2024-10-04 NOTE — PATIENT INSTRUCTIONS
1) Your Hemoglobin A1c (3 month test of blood sugar) is 5.7% which is lower than I need it to be as my goal is 7-7.5%.    2) Please decrease your lantus to 20 units at bedtime    3) Please decrease your novolog to the new scale before you eat  Blood sugar                 Insulin dose                                                                                           Less than 90               Eat first, take 6 units                                                  7 units                         151-200                       8 units                                                 201-250                       9 units                                     251-300                       10 units                                                 301-350                       11 units                                                 351-400                       12 units  401-450                       13 units  451-500                       14 units  Over 500                     15 units    4) BUN and creatinine are markers of kidney function.  Your values are normal.    5) ALT and AST are markers of liver function.  Your values are normal.    6) Your urine protein is normal and blood pressure is well controlled.    7) Your cholesterol is better than last time and your triglycerides (short term fats) improved from 254 to 197.    8) If you don't feel up to driving in 6 months we can change to a virtual visit.

## 2024-10-04 NOTE — PROGRESS NOTES
lisinopril 40 mg daily  - cont hctz 25 mg daily  - cont hydralazine 25 mg bid  - monitor home blood pressure readings       3. Mixed hyperlipidemia: Given DM and CAD, Goal LDL < 70, non-HDL < 100, and TG < 150. Non- and LDL 78 on zetia in Oct 2012, up to 137 and 92 in 6/13 due to weight gain and 145 and 90 in 10/13.  Down to 140 and 88 in 4/14 and 131 and 74 in 9/14 and 135 and 100 in 3/15 and 85 in 9/15 and 64 in 3/16 and 84 in 9/16.  Up to 115 in 9/17 and 128 in 5/19.  LDL 98 and  in 2/20.   and  in 8/20.  LDL 83 and  in 2/21.  LDL 89 and  in 3/22 and LDL 93 and  in 9/22.  LDL 89 and  in 3/23.   and  in 9/23.  LDL 85 and  in 10/24.  - cont zetia 10 mg daily  - check lipids now and prior to next visit       Patient Instructions   1) Your Hemoglobin A1c (3 month test of blood sugar) is 5.7% which is lower than I need it to be as my goal is 7-7.5%.    2) Please decrease your lantus to 20 units at bedtime    3) Please decrease your novolog to the new scale before you eat  Blood sugar                 Insulin dose                                                                                           Less than 90               Eat first, take 6 units                                                  7 units                         151-200                       8 units                                                 201-250                       9 units                                     251-300                       10 units                                                 301-350                       11 units                                                 351-400                       12 units  401-450                       13 units  451-500                       14 units  Over 500                     15 units    4) BUN and creatinine are markers of kidney function.  Your values are normal.    5) ALT and AST are markers of liver function.

## 2024-11-25 RX ORDER — EZETIMIBE 10 MG/1
10 TABLET ORAL DAILY
Qty: 90 TABLET | Refills: 3 | Status: SHIPPED | OUTPATIENT
Start: 2024-11-25

## 2025-01-08 RX ORDER — SYRINGE AND NEEDLE,INSULIN,1ML 31 GX5/16"
SYRINGE, EMPTY DISPOSABLE MISCELLANEOUS
Qty: 400 EACH | Refills: 3 | Status: SHIPPED | OUTPATIENT
Start: 2025-01-08

## 2025-03-03 RX ORDER — DULOXETIN HYDROCHLORIDE 30 MG/1
30 CAPSULE, DELAYED RELEASE ORAL NIGHTLY
Qty: 90 CAPSULE | Refills: 3 | Status: SHIPPED | OUTPATIENT
Start: 2025-03-03

## 2025-03-18 ENCOUNTER — TELEPHONE (OUTPATIENT)
Age: 75
End: 2025-03-18

## 2025-03-18 DIAGNOSIS — E11.9 TYPE 2 DIABETES MELLITUS WITHOUT COMPLICATION, WITH LONG-TERM CURRENT USE OF INSULIN: Primary | ICD-10-CM

## 2025-03-18 DIAGNOSIS — Z79.4 TYPE 2 DIABETES MELLITUS WITHOUT COMPLICATION, WITH LONG-TERM CURRENT USE OF INSULIN: Primary | ICD-10-CM

## 2025-03-18 RX ORDER — ACYCLOVIR 400 MG/1
TABLET ORAL
Qty: 9 EACH | Refills: 3 | Status: SHIPPED | OUTPATIENT
Start: 2025-03-18

## 2025-04-11 LAB — HBA1C MFR BLD HPLC: 6.4 %

## 2025-04-14 RX ORDER — LISINOPRIL 40 MG/1
TABLET ORAL DAILY
Qty: 90 TABLET | Refills: 3 | Status: SHIPPED | OUTPATIENT
Start: 2025-04-14

## 2025-04-15 ENCOUNTER — OFFICE VISIT (OUTPATIENT)
Age: 75
End: 2025-04-15
Payer: MEDICARE

## 2025-04-15 DIAGNOSIS — I10 ESSENTIAL (PRIMARY) HYPERTENSION: ICD-10-CM

## 2025-04-15 DIAGNOSIS — E11.9 TYPE 2 DIABETES MELLITUS WITHOUT COMPLICATION, WITH LONG-TERM CURRENT USE OF INSULIN: Primary | ICD-10-CM

## 2025-04-15 DIAGNOSIS — E78.2 MIXED HYPERLIPIDEMIA: ICD-10-CM

## 2025-04-15 DIAGNOSIS — Z79.4 TYPE 2 DIABETES MELLITUS WITHOUT COMPLICATION, WITH LONG-TERM CURRENT USE OF INSULIN: Primary | ICD-10-CM

## 2025-04-15 PROCEDURE — 3017F COLORECTAL CA SCREEN DOC REV: CPT | Performed by: INTERNAL MEDICINE

## 2025-04-15 PROCEDURE — 1090F PRES/ABSN URINE INCON ASSESS: CPT | Performed by: INTERNAL MEDICINE

## 2025-04-15 PROCEDURE — 1036F TOBACCO NON-USER: CPT | Performed by: INTERNAL MEDICINE

## 2025-04-15 PROCEDURE — G8427 DOCREV CUR MEDS BY ELIG CLIN: HCPCS | Performed by: INTERNAL MEDICINE

## 2025-04-15 PROCEDURE — G8400 PT W/DXA NO RESULTS DOC: HCPCS | Performed by: INTERNAL MEDICINE

## 2025-04-15 PROCEDURE — 99214 OFFICE O/P EST MOD 30 MIN: CPT | Performed by: INTERNAL MEDICINE

## 2025-04-15 PROCEDURE — 2022F DILAT RTA XM EVC RTNOPTHY: CPT | Performed by: INTERNAL MEDICINE

## 2025-04-15 PROCEDURE — 3046F HEMOGLOBIN A1C LEVEL >9.0%: CPT | Performed by: INTERNAL MEDICINE

## 2025-04-15 PROCEDURE — 1159F MED LIST DOCD IN RCRD: CPT | Performed by: INTERNAL MEDICINE

## 2025-04-15 PROCEDURE — 1123F ACP DISCUSS/DSCN MKR DOCD: CPT | Performed by: INTERNAL MEDICINE

## 2025-04-15 PROCEDURE — G8417 CALC BMI ABV UP PARAM F/U: HCPCS | Performed by: INTERNAL MEDICINE

## 2025-04-15 PROCEDURE — 1160F RVW MEDS BY RX/DR IN RCRD: CPT | Performed by: INTERNAL MEDICINE

## 2025-04-15 RX ORDER — INSULIN ASPART 100 [IU]/ML
INJECTION, SOLUTION INTRAVENOUS; SUBCUTANEOUS
Qty: 30 ML | Refills: 11 | Status: SHIPPED | OUTPATIENT
Start: 2025-04-15

## 2025-04-15 RX ORDER — HYDROCHLOROTHIAZIDE 25 MG/1
12.5 TABLET ORAL DAILY
Qty: 90 TABLET | Refills: 3 | Status: SHIPPED | OUTPATIENT
Start: 2025-04-15

## 2025-04-15 RX ORDER — INSULIN GLARGINE 100 [IU]/ML
INJECTION, SOLUTION SUBCUTANEOUS
Qty: 30 ML | Refills: 3 | Status: SHIPPED | OUTPATIENT
Start: 2025-04-15

## 2025-04-15 NOTE — PATIENT INSTRUCTIONS
1) Your Hemoglobin A1c (3 month test of blood sugar) is 6.4% which is at goal under 7-7.5%.    2) Stay on lantus 15 units at night.    3) Please decrease your novolog to the new scale before you eat  Blood sugar                 Insulin dose                                                                                           Less than 90               Eat first, take 5 units                                                  6 units                         151-200                       7 units                                                 201-250                       8 units                                     251-300                       9 units                                                 301-350                       10 units                                                 351-400                       11 units  401-450                       12 units  451-500                       13 units  Over 500                     14 units    4) BUN and creatinine are markers of kidney function.  Your creatinine is stable at 1.32 and normal is 1.0.  Try stopping the hydrochlorothiazide (hctz) and only take 1/2 tab as needed if you have any leg swelling.    5) ALT and AST are markers of liver function.  Your values are normal.    6) Your urine protein is normal.    7) Your cholesterol is at goal and your triglycerides (short term fats) are normal with losing weight.    8) Please come for a follow up visit on 10/13/25 at 2:30pm in our [place] office.

## 2025-04-15 NOTE — PROGRESS NOTES
above 150 mg/dl--max 30 units/day--Dose change 04/15/25--updated med list--did not send prescription to the pharmacy    lisinopril (PRINIVIL;ZESTRIL) 40 MG tablet TAKE 1 TABLET BY MOUTH DAILY    Continuous Glucose Sensor (DEXCOM G7 SENSOR) MISC CHG EVERY FOR 10 DAYS    DULoxetine (CYMBALTA) 30 MG extended release capsule TAKE 1 CAPSULE BY MOUTH EVERY NIGHT    metFORMIN (GLUCOPHAGE) 1000 MG tablet Take 1/2 tab in am and 1 tab in pm    Insulin Syringe-Needle U-100 (ULTICARE INSULIN SYRINGE) 31G X 5/16\" 1 ML MISC USE AS DIRECTED TO INJECT INSULIN FOUR TIMES DAILY    ezetimibe (ZETIA) 10 MG tablet TAKE 1 TABLET BY MOUTH DAILY    gabapentin (NEURONTIN) 600 MG tablet in the morning, at noon, in the evening, and at bedtime.    hydrALAZINE (APRESOLINE) 25 MG tablet TAKE 1 TABLET BY MOUTH TWICE DAILY    ATIVAN 0.5 MG tablet Take 1 tablet by mouth at bedtime.    Continuous Blood Gluc  (DEXCOM G7 ) JESENIA Use as directed with dexcom G7 sensors    blood glucose monitor strips Test 3 times daily--dispense whatever brand fits her glucocard meter    Omeprazole Magnesium (PRILOSEC OTC PO) Take by mouth 30 mg daily    aspirin 81 MG EC tablet Take by mouth daily    metoprolol succinate (TOPROL XL) 200 MG extended release tablet TAKE 1 TABLET BY MOUTH NIGHTLY. STOP 100MG TABLETS     No current facility-administered medications for this visit.     Allergies   Allergen Reactions    Iodine Rash, Shortness Of Breath and Swelling    Benzoyl Peroxide Other (See Comments)    Bupropion Other (See Comments)     Had homicidal thoughts    Fish Oil Other (See Comments)     Shortness of breath, rash and nausea    Gemfibrozil Other (See Comments)     Nausea, liver pain, bone pain    Prochlorperazine Edisylate Other (See Comments)    Statins Myalgia     With lipitor, crestor, and pravastatin    Adhesive Tape Rash    Oxycodone Rash       Review of Systems: PER HPI    Physical Examination: NOT PERFORMED DUE TO THIS BEING A TELEPHONE

## 2025-06-30 RX ORDER — DULOXETIN HYDROCHLORIDE 30 MG/1
30 CAPSULE, DELAYED RELEASE ORAL NIGHTLY
Qty: 90 CAPSULE | Refills: 3 | Status: SHIPPED | OUTPATIENT
Start: 2025-06-30

## 2025-07-01 RX ORDER — HYDROCHLOROTHIAZIDE 25 MG/1
25 TABLET ORAL DAILY
Qty: 90 TABLET | Refills: 3 | Status: SHIPPED | OUTPATIENT
Start: 2025-07-01

## 2025-08-06 ENCOUNTER — TELEPHONE (OUTPATIENT)
Age: 75
End: 2025-08-06

## 2025-08-06 RX ORDER — INSULIN GLARGINE 100 [IU]/ML
INJECTION, SOLUTION SUBCUTANEOUS
Qty: 15 ML | Refills: 11 | Status: SHIPPED | OUTPATIENT
Start: 2025-08-06

## 2025-08-20 RX ORDER — HYDRALAZINE HYDROCHLORIDE 25 MG/1
25 TABLET, FILM COATED ORAL 2 TIMES DAILY
Qty: 180 TABLET | Refills: 3 | Status: SHIPPED | OUTPATIENT
Start: 2025-08-20

## 2025-08-27 ENCOUNTER — TELEPHONE (OUTPATIENT)
Age: 75
End: 2025-08-27

## 2025-08-29 RX ORDER — INSULIN GLARGINE 100 [IU]/ML
INJECTION, SOLUTION SUBCUTANEOUS
Qty: 30 ML | Refills: 3 | Status: SHIPPED | OUTPATIENT
Start: 2025-08-29

## 2025-09-03 ENCOUNTER — TELEPHONE (OUTPATIENT)
Age: 75
End: 2025-09-03